# Patient Record
Sex: MALE | Race: OTHER | HISPANIC OR LATINO | URBAN - METROPOLITAN AREA
[De-identification: names, ages, dates, MRNs, and addresses within clinical notes are randomized per-mention and may not be internally consistent; named-entity substitution may affect disease eponyms.]

---

## 2022-06-10 ENCOUNTER — INPATIENT (INPATIENT)
Facility: HOSPITAL | Age: 72
LOS: 4 days | Discharge: HOME | DRG: 284 | End: 2022-06-15
Attending: INTERNAL MEDICINE | Admitting: INTERNAL MEDICINE
Payer: MEDICAID

## 2022-06-10 VITALS
OXYGEN SATURATION: 97 % | RESPIRATION RATE: 17 BRPM | HEART RATE: 88 BPM | DIASTOLIC BLOOD PRESSURE: 87 MMHG | SYSTOLIC BLOOD PRESSURE: 143 MMHG | TEMPERATURE: 100 F

## 2022-06-10 DIAGNOSIS — K81.9 CHOLECYSTITIS, UNSPECIFIED: ICD-10-CM

## 2022-06-10 LAB
ALBUMIN SERPL ELPH-MCNC: 3 G/DL — LOW (ref 3.3–5)
ALP SERPL-CCNC: 94 U/L — SIGNIFICANT CHANGE UP (ref 40–120)
ALT FLD-CCNC: 57 U/L — SIGNIFICANT CHANGE UP (ref 12–78)
ANION GAP SERPL CALC-SCNC: 7 MMOL/L — SIGNIFICANT CHANGE UP (ref 5–17)
APTT BLD: 31.2 SEC — SIGNIFICANT CHANGE UP (ref 27.5–35.5)
AST SERPL-CCNC: 38 U/L — HIGH (ref 15–37)
BASOPHILS # BLD AUTO: 0.04 K/UL — SIGNIFICANT CHANGE UP (ref 0–0.2)
BASOPHILS NFR BLD AUTO: 0.3 % — SIGNIFICANT CHANGE UP (ref 0–2)
BILIRUB SERPL-MCNC: 1.3 MG/DL — HIGH (ref 0.2–1.2)
BUN SERPL-MCNC: 12 MG/DL — SIGNIFICANT CHANGE UP (ref 7–23)
CALCIUM SERPL-MCNC: 8.8 MG/DL — SIGNIFICANT CHANGE UP (ref 8.5–10.1)
CHLORIDE SERPL-SCNC: 105 MMOL/L — SIGNIFICANT CHANGE UP (ref 96–108)
CO2 SERPL-SCNC: 24 MMOL/L — SIGNIFICANT CHANGE UP (ref 22–31)
CREAT SERPL-MCNC: 0.95 MG/DL — SIGNIFICANT CHANGE UP (ref 0.5–1.3)
D DIMER BLD IA.RAPID-MCNC: 1945 NG/ML DDU — HIGH
EGFR: 86 ML/MIN/1.73M2 — SIGNIFICANT CHANGE UP
EOSINOPHIL # BLD AUTO: 0.1 K/UL — SIGNIFICANT CHANGE UP (ref 0–0.5)
EOSINOPHIL NFR BLD AUTO: 0.7 % — SIGNIFICANT CHANGE UP (ref 0–6)
FLUAV AG NPH QL: SIGNIFICANT CHANGE UP
FLUBV AG NPH QL: SIGNIFICANT CHANGE UP
GLUCOSE SERPL-MCNC: 119 MG/DL — HIGH (ref 70–99)
HCT VFR BLD CALC: 41.3 % — SIGNIFICANT CHANGE UP (ref 39–50)
HGB BLD-MCNC: 13.9 G/DL — SIGNIFICANT CHANGE UP (ref 13–17)
IMM GRANULOCYTES NFR BLD AUTO: 0.6 % — SIGNIFICANT CHANGE UP (ref 0–1.5)
INR BLD: 1.21 RATIO — HIGH (ref 0.88–1.16)
LIDOCAIN IGE QN: 193 U/L — SIGNIFICANT CHANGE UP (ref 73–393)
LYMPHOCYTES # BLD AUTO: 1.47 K/UL — SIGNIFICANT CHANGE UP (ref 1–3.3)
LYMPHOCYTES # BLD AUTO: 10.7 % — LOW (ref 13–44)
MCHC RBC-ENTMCNC: 30.2 PG — SIGNIFICANT CHANGE UP (ref 27–34)
MCHC RBC-ENTMCNC: 33.7 GM/DL — SIGNIFICANT CHANGE UP (ref 32–36)
MCV RBC AUTO: 89.8 FL — SIGNIFICANT CHANGE UP (ref 80–100)
MONOCYTES # BLD AUTO: 0.67 K/UL — SIGNIFICANT CHANGE UP (ref 0–0.9)
MONOCYTES NFR BLD AUTO: 4.9 % — SIGNIFICANT CHANGE UP (ref 2–14)
NEUTROPHILS # BLD AUTO: 11.37 K/UL — HIGH (ref 1.8–7.4)
NEUTROPHILS NFR BLD AUTO: 82.8 % — HIGH (ref 43–77)
PLATELET # BLD AUTO: 277 K/UL — SIGNIFICANT CHANGE UP (ref 150–400)
POTASSIUM SERPL-MCNC: 4.2 MMOL/L — SIGNIFICANT CHANGE UP (ref 3.5–5.3)
POTASSIUM SERPL-SCNC: 4.2 MMOL/L — SIGNIFICANT CHANGE UP (ref 3.5–5.3)
PROT SERPL-MCNC: 7.4 GM/DL — SIGNIFICANT CHANGE UP (ref 6–8.3)
PROTHROM AB SERPL-ACNC: 14.1 SEC — HIGH (ref 10.5–13.4)
RBC # BLD: 4.6 M/UL — SIGNIFICANT CHANGE UP (ref 4.2–5.8)
RBC # FLD: 13.2 % — SIGNIFICANT CHANGE UP (ref 10.3–14.5)
RSV RNA NPH QL NAA+NON-PROBE: SIGNIFICANT CHANGE UP
SARS-COV-2 RNA SPEC QL NAA+PROBE: DETECTED
SODIUM SERPL-SCNC: 136 MMOL/L — SIGNIFICANT CHANGE UP (ref 135–145)
TROPONIN I, HIGH SENSITIVITY RESULT: <3 NG/L — SIGNIFICANT CHANGE UP
WBC # BLD: 13.73 K/UL — HIGH (ref 3.8–10.5)
WBC # FLD AUTO: 13.73 K/UL — HIGH (ref 3.8–10.5)

## 2022-06-10 PROCEDURE — 85027 COMPLETE CBC AUTOMATED: CPT

## 2022-06-10 PROCEDURE — 93010 ELECTROCARDIOGRAM REPORT: CPT

## 2022-06-10 PROCEDURE — 87070 CULTURE OTHR SPECIMN AEROBIC: CPT

## 2022-06-10 PROCEDURE — 78226 HEPATOBILIARY SYSTEM IMAGING: CPT | Mod: MG

## 2022-06-10 PROCEDURE — 86803 HEPATITIS C AB TEST: CPT

## 2022-06-10 PROCEDURE — 71275 CT ANGIOGRAPHY CHEST: CPT | Mod: 26

## 2022-06-10 PROCEDURE — 84100 ASSAY OF PHOSPHORUS: CPT

## 2022-06-10 PROCEDURE — C1769: CPT

## 2022-06-10 PROCEDURE — 47490 INCISION OF GALLBLADDER: CPT

## 2022-06-10 PROCEDURE — 85025 COMPLETE CBC W/AUTO DIFF WBC: CPT

## 2022-06-10 PROCEDURE — 83735 ASSAY OF MAGNESIUM: CPT

## 2022-06-10 PROCEDURE — C1729: CPT

## 2022-06-10 PROCEDURE — 71275 CT ANGIOGRAPHY CHEST: CPT | Mod: MA

## 2022-06-10 PROCEDURE — 87075 CULTR BACTERIA EXCEPT BLOOD: CPT

## 2022-06-10 PROCEDURE — G1004: CPT

## 2022-06-10 PROCEDURE — 76705 ECHO EXAM OF ABDOMEN: CPT | Mod: 26

## 2022-06-10 PROCEDURE — 93970 EXTREMITY STUDY: CPT

## 2022-06-10 PROCEDURE — 36415 COLL VENOUS BLD VENIPUNCTURE: CPT

## 2022-06-10 PROCEDURE — A9537: CPT

## 2022-06-10 PROCEDURE — 71046 X-RAY EXAM CHEST 2 VIEWS: CPT | Mod: 26

## 2022-06-10 PROCEDURE — 99285 EMERGENCY DEPT VISIT HI MDM: CPT

## 2022-06-10 PROCEDURE — 80053 COMPREHEN METABOLIC PANEL: CPT

## 2022-06-10 RX ORDER — PIPERACILLIN AND TAZOBACTAM 4; .5 G/20ML; G/20ML
3.38 INJECTION, POWDER, LYOPHILIZED, FOR SOLUTION INTRAVENOUS EVERY 8 HOURS
Refills: 0 | Status: DISCONTINUED | OUTPATIENT
Start: 2022-06-10 | End: 2022-06-15

## 2022-06-10 RX ORDER — PIPERACILLIN AND TAZOBACTAM 4; .5 G/20ML; G/20ML
3.38 INJECTION, POWDER, LYOPHILIZED, FOR SOLUTION INTRAVENOUS ONCE
Refills: 0 | Status: COMPLETED | OUTPATIENT
Start: 2022-06-10 | End: 2022-06-10

## 2022-06-10 RX ADMIN — PIPERACILLIN AND TAZOBACTAM 200 GRAM(S): 4; .5 INJECTION, POWDER, LYOPHILIZED, FOR SOLUTION INTRAVENOUS at 22:13

## 2022-06-10 NOTE — CONSULT NOTE ADULT - ASSESSMENT
73 yo male with h/o HTN?, with RUQ abd pain, gallstones, posit COVID  -I think  he probably has  chronic cholecystitis vs acute, thickened gallbladder wall 7mm. Recommend HIDA scan. If HIDA scan is positive he should get IR percutaneous cholecystostomy since he has COVID and has been having RUQ abd pain for at least 6 days  -IV Abx  -NPO  -Medicine admission

## 2022-06-10 NOTE — CONSULT NOTE ADULT - SUBJECTIVE AND OBJECTIVE BOX
71 yo male Thai speaking h/o HTN? who started coughing and having RUQ abdominal pain 6 days ago, pain is about 5/10. Patient refers having RUQ abd pain in the past. RUQ US showed distended gallbladder with gallstone, thickened gallbladder wall 7mm. WBC 13,000. T gennaro 1.3. Covid posit.    HPI:      PAST MEDICAL & SURGICAL HISTORY:  Mild HTN          REVIEW OF SYSTEMS:    CONSTITUTIONAL: No weakness, fevers or chills  EYES/ENT: No visual changes;  No vertigo or throat pain   NECK: No pain or stiffness  RESPIRATORY: cough, no wheezing, hemoptysis; No shortness of breath  CARDIOVASCULAR: No chest pain or palpitations  GASTROINTESTINAL: abdominal  pain.  nausea, no vomiting, or hematemesis; No diarrhea or constipation. No melena or hematochezia.  GENITOURINARY: No dysuria, frequency or hematuria  NEUROLOGICAL: No numbness or weakness  SKIN: No itching, burning, rashes, or lesions   All other review of systems is negative unless indicated above.    MEDICATIONS  (STANDING):  piperacillin/tazobactam IVPB... 3.375 Gram(s) IV Intermittent once    MEDICATIONS  (PRN):      Allergies    No Known Allergies    Intolerances        SOCIAL HISTORY: non smoker    FAMILY HISTORY: non contributory      Vital Signs Last 24 Hrs  T(C): 37.7 (10 Avelino 2022 18:14), Max: 37.7 (10 Avelino 2022 18:14)  T(F): 99.8 (10 Avelino 2022 18:14), Max: 99.8 (10 Avelino 2022 18:14)  HR: 88 (10 Avelino 2022 18:14) (88 - 88)  BP: 143/87 (10 Avelino 2022 18:14) (143/87 - 143/87)  BP(mean): 102 (10 Avelino 2022 18:14) (102 - 102)  RR: 17 (10 Avelino 2022 18:14) (17 - 17)  SpO2: 97% (10 Avelino 2022 18:14) (97% - 97%)    .  VITAL SIGNS:  T(C): 37.7 (06-10-22 @ 18:14), Max: 37.7 (06-10-22 @ 18:14)  T(F): 99.8 (06-10-22 @ 18:14), Max: 99.8 (06-10-22 @ 18:14)  HR: 88 (06-10-22 @ 18:14) (88 - 88)  BP: 143/87 (06-10-22 @ 18:14) (143/87 - 143/87)  BP(mean): 102 (06-10-22 @ 18:14) (102 - 102)  RR: 17 (06-10-22 @ 18:14) (17 - 17)  SpO2: 97% (06-10-22 @ 18:14) (97% - 97%)  Wt(kg): --    PHYSICAL EXAM:    Constitutional: resting comfortably in bed; NAD  Eyes: PERRL, EOMI, anicteric sclera  ENT: no nasal discharge; uvula midline, no oropharyngeal erythema or exudates  Neck: supple; no JVD or thyromegaly  Respiratory: CTA B/L; no W/R/R, no retractions  Cardiac: +S1/S2; RRR; no murmurs  Gastrointestinal: soft, Ruq tenderness  Back: spine midline, no bony tenderness or step-offs; no CVAT B/L  Extremities: no clubbing or cyanosis; no peripheral edema  Musculoskeletal: NROM x4; no joint swelling, tenderness or erythema  Vascular: 2+ radial, femoral, DP/PT pulses B/L  Dermatologic: skin warm, dry and intact; no rashes, wounds, or scars  Lymphatic: no submandibular or cervical LAD  Neurologic: AAOx3; CNII-XII grossly intact; no focal deficits  Psychiatric: affect and characteristics of appearance, verbalizations, behaviors are appropriate    LABS:                        13.9   13.73 )-----------( 277      ( 10 Avelino 2022 20:07 )             41.3       06-10    136  |  105  |  12  ----------------------------<  119<H>  4.2   |  24  |  0.95    Ca    8.8      10 Avelino 2022 20:07    TPro  7.4  /  Alb  3.0<L>  /  TBili  1.3<H>  /  DBili  x   /  AST  38<H>  /  ALT  57  /  AlkPhos  94  06-10      PT/INR - ( 10 Avelino 2022 20:07 )   PT: 14.1 sec;   INR: 1.21 ratio         PTT - ( 10 Avelino 2022 20:07 )  PTT:31.2 sec        RADIOLOGY & ADDITIONAL STUDIES:  < from: US Abdomen Upper Quadrant Right (06.10.22 @ 19:31) >    Liver: Increased echogenicity.  Bile ducts: Normal caliber. Common bile duct measures 4 mm.  Gallbladder: Distended with multiple shadowing gallstones and sludge.   Wall thickening and edema measuring 7 mm. Technologist reports positive   sonographic Roy sign.  Pancreas: Poorly visualized.  Right kidney: Poorly visualized.  Ascites: None.  IVC: Poorly visualized.    IMPRESSION:  Limited study due to overlying bowel gas.    Cholelithiasis with gallbladder wall thickening and positive sonographic   Roy sign, suspicious for acute cholecystitis.    < end of copied text >

## 2022-06-10 NOTE — ED STATDOCS - OBJECTIVE STATEMENT
72 y M no known medical problems presenting with right upper quadrant abdominal pain ongoing for 6 days.  Occurs intermittently and does seem to worsen occasionally with food intake.  Nauseated but no vomiting.  Occasionally feels dizzy.  Some pain with deep inspiration.  No active chest pain or shortness of breath.  Has had subjective fevers.  No diarrhea.  Still able to tolerate p.o.  No prior abdominal surgeries.    Pac : [id number]

## 2022-06-10 NOTE — ED STATDOCS - CLINICAL SUMMARY MEDICAL DECISION MAKING FREE TEXT BOX
Unclear if this is thoracic or abdominal.  More likely abdominal.  Would evaluate for cholecystitis.  Less suspicious for PE or ACS.  No obvious evidence of SBO.  No tenderness to abdomen aside from right upper quadrant.  Dispo pending labs, imaging, reassessment. Unclear if this is thoracic or abdominal.  More likely abdominal.  Would evaluate for cholecystitis.  Less suspicious for PE or ACS.  No obvious evidence of SBO.  No tenderness to abdomen aside from right upper quadrant.  Dispo pending labs, imaging, reassessment.    PA: Patient presents with RUQ pain. Workup pending. care transitioned to evening team. ~Vahe Tracy PA-C

## 2022-06-10 NOTE — ED ADULT NURSE NOTE - OBJECTIVE STATEMENT
py presenting with right upper quadrant abdominal pain ongoing for 6 days.  Occurs intermittently and does seem to worsen occasionally with food intake.  Nauseated but no vomiting. Labs drawn

## 2022-06-10 NOTE — ED STATDOCS - PROGRESS NOTE DETAILS
PA: Patient is a 72 year old male with PMHx of HTN, who presents to [facility] c/o RUQ pain x 6 days. +Nausea. DENIES v/d. ABD pain worse with eating. Patient also c/o pleuritic CP, worse with deep breaths. ~Vahe Tracy PA-C JOSE pending. Care transitioned to evening team. ~Vahe Tracy PA-C Spoke with Dr. Brooks. States he wouldn't do surgery on the pt especially since he became positive for covid and thinks the gallbladder wall thickening is more chronic in nature. Recommends HIDA in the morning and if its positive then would recommend percutaneous cholecystoscopy. -Dwight Almonte PA-C

## 2022-06-10 NOTE — ED ADULT NURSE NOTE - NSIMPLEMENTINTERV_GEN_ALL_ED
Implemented All Fall Risk Interventions:  [place] to call system. Call bell, personal items and telephone within reach. Instruct patient to call for assistance. Room bathroom lighting operational. Non-slip footwear when patient is off stretcher. Physically safe environment: no spills, clutter or unnecessary equipment. Stretcher in lowest position, wheels locked, appropriate side rails in place. Provide visual cue, wrist band, yellow gown, etc. Monitor gait and stability. Monitor for mental status changes and reorient to person, place, and time. Review medications for side effects contributing to fall risk. Reinforce activity limits and safety measures with patient and family.

## 2022-06-10 NOTE — ED STATDOCS - PHYSICAL EXAMINATION
Constitutional: NAD, well appearing  HEENT: no rhinorrhea  CVS:  RRR, no m/r/g  Resp:  CTAB  GI: soft, moderate ttp to RUQ  MSK:  no restriction to rom, full ROM to all extremities  Neuro:  A&Ox3, moving all extremities equally  Skin: no rash  psych: clear thought content  Heme/lymph:  No LAD

## 2022-06-10 NOTE — ED STATDOCS - CARE PLAN
1 Principal Discharge DX:	Cholecystitis  Secondary Diagnosis:	2019 novel coronavirus disease (COVID-19)  Secondary Diagnosis:	Abdominal pain

## 2022-06-11 LAB
ALBUMIN SERPL ELPH-MCNC: 2.7 G/DL — LOW (ref 3.3–5)
ALP SERPL-CCNC: 79 U/L — SIGNIFICANT CHANGE UP (ref 40–120)
ALT FLD-CCNC: 44 U/L — SIGNIFICANT CHANGE UP (ref 12–78)
ANION GAP SERPL CALC-SCNC: 7 MMOL/L — SIGNIFICANT CHANGE UP (ref 5–17)
AST SERPL-CCNC: 25 U/L — SIGNIFICANT CHANGE UP (ref 15–37)
BASOPHILS # BLD AUTO: 0.03 K/UL — SIGNIFICANT CHANGE UP (ref 0–0.2)
BASOPHILS NFR BLD AUTO: 0.3 % — SIGNIFICANT CHANGE UP (ref 0–2)
BILIRUB SERPL-MCNC: 1.8 MG/DL — HIGH (ref 0.2–1.2)
BUN SERPL-MCNC: 9 MG/DL — SIGNIFICANT CHANGE UP (ref 7–23)
CALCIUM SERPL-MCNC: 8.9 MG/DL — SIGNIFICANT CHANGE UP (ref 8.5–10.1)
CHLORIDE SERPL-SCNC: 104 MMOL/L — SIGNIFICANT CHANGE UP (ref 96–108)
CO2 SERPL-SCNC: 26 MMOL/L — SIGNIFICANT CHANGE UP (ref 22–31)
CREAT SERPL-MCNC: 0.88 MG/DL — SIGNIFICANT CHANGE UP (ref 0.5–1.3)
EGFR: 92 ML/MIN/1.73M2 — SIGNIFICANT CHANGE UP
EOSINOPHIL # BLD AUTO: 0.1 K/UL — SIGNIFICANT CHANGE UP (ref 0–0.5)
EOSINOPHIL NFR BLD AUTO: 0.9 % — SIGNIFICANT CHANGE UP (ref 0–6)
GLUCOSE SERPL-MCNC: 117 MG/DL — HIGH (ref 70–99)
HCT VFR BLD CALC: 38.4 % — LOW (ref 39–50)
HCV AB S/CO SERPL IA: 0.11 S/CO — SIGNIFICANT CHANGE UP (ref 0–0.99)
HCV AB SERPL-IMP: SIGNIFICANT CHANGE UP
HGB BLD-MCNC: 12.9 G/DL — LOW (ref 13–17)
IMM GRANULOCYTES NFR BLD AUTO: 0.8 % — SIGNIFICANT CHANGE UP (ref 0–1.5)
LYMPHOCYTES # BLD AUTO: 1.16 K/UL — SIGNIFICANT CHANGE UP (ref 1–3.3)
LYMPHOCYTES # BLD AUTO: 10.6 % — LOW (ref 13–44)
MCHC RBC-ENTMCNC: 29.8 PG — SIGNIFICANT CHANGE UP (ref 27–34)
MCHC RBC-ENTMCNC: 33.6 GM/DL — SIGNIFICANT CHANGE UP (ref 32–36)
MCV RBC AUTO: 88.7 FL — SIGNIFICANT CHANGE UP (ref 80–100)
MONOCYTES # BLD AUTO: 0.69 K/UL — SIGNIFICANT CHANGE UP (ref 0–0.9)
MONOCYTES NFR BLD AUTO: 6.3 % — SIGNIFICANT CHANGE UP (ref 2–14)
NEUTROPHILS # BLD AUTO: 8.84 K/UL — HIGH (ref 1.8–7.4)
NEUTROPHILS NFR BLD AUTO: 81.1 % — HIGH (ref 43–77)
PLATELET # BLD AUTO: 269 K/UL — SIGNIFICANT CHANGE UP (ref 150–400)
POTASSIUM SERPL-MCNC: 3.7 MMOL/L — SIGNIFICANT CHANGE UP (ref 3.5–5.3)
POTASSIUM SERPL-SCNC: 3.7 MMOL/L — SIGNIFICANT CHANGE UP (ref 3.5–5.3)
PROT SERPL-MCNC: 6.9 GM/DL — SIGNIFICANT CHANGE UP (ref 6–8.3)
RBC # BLD: 4.33 M/UL — SIGNIFICANT CHANGE UP (ref 4.2–5.8)
RBC # FLD: 13.1 % — SIGNIFICANT CHANGE UP (ref 10.3–14.5)
SODIUM SERPL-SCNC: 137 MMOL/L — SIGNIFICANT CHANGE UP (ref 135–145)
WBC # BLD: 10.91 K/UL — HIGH (ref 3.8–10.5)
WBC # FLD AUTO: 10.91 K/UL — HIGH (ref 3.8–10.5)

## 2022-06-11 PROCEDURE — 99223 1ST HOSP IP/OBS HIGH 75: CPT

## 2022-06-11 PROCEDURE — 93970 EXTREMITY STUDY: CPT | Mod: 26

## 2022-06-11 PROCEDURE — 12345: CPT | Mod: NC

## 2022-06-11 PROCEDURE — 78226 HEPATOBILIARY SYSTEM IMAGING: CPT | Mod: 26

## 2022-06-11 RX ORDER — ACETAMINOPHEN 500 MG
650 TABLET ORAL EVERY 6 HOURS
Refills: 0 | Status: DISCONTINUED | OUTPATIENT
Start: 2022-06-11 | End: 2022-06-15

## 2022-06-11 RX ORDER — SODIUM CHLORIDE 9 MG/ML
1000 INJECTION, SOLUTION INTRAVENOUS
Refills: 0 | Status: DISCONTINUED | OUTPATIENT
Start: 2022-06-11 | End: 2022-06-14

## 2022-06-11 RX ORDER — TRAMADOL HYDROCHLORIDE 50 MG/1
25 TABLET ORAL EVERY 6 HOURS
Refills: 0 | Status: DISCONTINUED | OUTPATIENT
Start: 2022-06-11 | End: 2022-06-15

## 2022-06-11 RX ORDER — PIPERACILLIN AND TAZOBACTAM 4; .5 G/20ML; G/20ML
3.38 INJECTION, POWDER, LYOPHILIZED, FOR SOLUTION INTRAVENOUS EVERY 8 HOURS
Refills: 0 | Status: DISCONTINUED | OUTPATIENT
Start: 2022-06-11 | End: 2022-06-11

## 2022-06-11 RX ORDER — MORPHINE SULFATE 50 MG/1
4 CAPSULE, EXTENDED RELEASE ORAL EVERY 4 HOURS
Refills: 0 | Status: DISCONTINUED | OUTPATIENT
Start: 2022-06-11 | End: 2022-06-15

## 2022-06-11 RX ORDER — ENOXAPARIN SODIUM 100 MG/ML
40 INJECTION SUBCUTANEOUS EVERY 24 HOURS
Refills: 0 | Status: DISCONTINUED | OUTPATIENT
Start: 2022-06-11 | End: 2022-06-12

## 2022-06-11 RX ORDER — ONDANSETRON 8 MG/1
4 TABLET, FILM COATED ORAL EVERY 8 HOURS
Refills: 0 | Status: DISCONTINUED | OUTPATIENT
Start: 2022-06-11 | End: 2022-06-15

## 2022-06-11 RX ADMIN — MORPHINE SULFATE 4 MILLIGRAM(S): 50 CAPSULE, EXTENDED RELEASE ORAL at 12:34

## 2022-06-11 RX ADMIN — PIPERACILLIN AND TAZOBACTAM 25 GRAM(S): 4; .5 INJECTION, POWDER, LYOPHILIZED, FOR SOLUTION INTRAVENOUS at 21:26

## 2022-06-11 RX ADMIN — Medication 650 MILLIGRAM(S): at 17:39

## 2022-06-11 RX ADMIN — MORPHINE SULFATE 4 MILLIGRAM(S): 50 CAPSULE, EXTENDED RELEASE ORAL at 13:36

## 2022-06-11 RX ADMIN — PIPERACILLIN AND TAZOBACTAM 25 GRAM(S): 4; .5 INJECTION, POWDER, LYOPHILIZED, FOR SOLUTION INTRAVENOUS at 14:02

## 2022-06-11 RX ADMIN — PIPERACILLIN AND TAZOBACTAM 25 GRAM(S): 4; .5 INJECTION, POWDER, LYOPHILIZED, FOR SOLUTION INTRAVENOUS at 05:43

## 2022-06-11 RX ADMIN — ENOXAPARIN SODIUM 40 MILLIGRAM(S): 100 INJECTION SUBCUTANEOUS at 17:39

## 2022-06-11 RX ADMIN — SODIUM CHLORIDE 75 MILLILITER(S): 9 INJECTION, SOLUTION INTRAVENOUS at 17:40

## 2022-06-11 NOTE — H&P ADULT - NSHPREVIEWOFSYSTEMS_GEN_ALL_CORE
Gen: + fevers, malaise  Eyes: no blurred vision or lacrimation  ENT: no tinnitus, vertigo, or decreased hearing  Resp: + cough, pleuritic lower chest/upper abdominal pain. No wheezing, other dyspnea, or hemoptysis  CV: no chest pain or palpitations  GI: + abdominal pain, nausea. No vomiting, diarrhea, constipation, melena, or hematochezia  : no dysuria, hematuria, or incontinence  MSK: no arthralgias, joint swelling, or myalgias  Neuro: no focal deficits, confusion, weakness, dizziness, tremors, or seizures  Skin: no rash, lesions, or edema

## 2022-06-11 NOTE — H&P ADULT - HISTORY OF PRESENT ILLNESS
71 yo Iranian-speaking M with a PMH of HTN (on a medication that he does not know the name) who presents with RUQ pain. The past has been going on for about 6 days, in his right lower chest/upper abdomen. It comes when he takes a deep breath. He also noted nausea without vomiting. He also endorses fevers during that time. He denies chest pain, but notes a chronic dry cough for the past 6 months. He denies diarrhea, N/V, pain elsewhere, rash, or swelling. He is from [facility] and has been there for the past year and returned to [place] a couple days ago. He denies any specific sick contacts. He is unsure if he has had weight changes but states his appetite has been good. He is vaccinated x2 (Pfizer) against COVID-19, but has not taken a booster.    In the ED, he was given Zosyn 3.375 g IV x1.

## 2022-06-11 NOTE — PROGRESS NOTE ADULT - SUBJECTIVE AND OBJECTIVE BOX
73 yo male with cholecystitis, COVID, c/o RUQ abd pain, low grade fever    Abd soft, RUQ tenderness              Vital Signs Last 24 Hrs  T(C): 37.3 (11 Jun 2022 08:00), Max: 37.8 (11 Jun 2022 02:18)  T(F): 99.1 (11 Jun 2022 08:00), Max: 100.1 (11 Jun 2022 02:18)  HR: 82 (11 Jun 2022 08:00) (82 - 101)  BP: 131/84 (11 Jun 2022 08:00) (127/74 - 143/87)  BP(mean): 102 (10 Avelino 2022 18:14) (102 - 102)  RR: 18 (11 Jun 2022 08:00) (17 - 18)  SpO2: 94% (11 Jun 2022 08:00) (94% - 98%)                          12.9   10.91 )-----------( 269      ( 11 Jun 2022 09:01 )             38.4       06-10    136  |  105  |  12  ----------------------------<  119<H>  4.2   |  24  |  0.95    Ca    8.8      10 Avelino 2022 20:07    TPro  7.4  /  Alb  3.0<L>  /  TBili  1.3<H>  /  DBili  x   /  AST  38<H>  /  ALT  57  /  AlkPhos  94  06-10      LIVER FUNCTIONS - ( 10 Avelino 2022 20:07 )  Alb: 3.0 g/dL / Pro: 7.4 gm/dL / ALK PHOS: 94 U/L / ALT: 57 U/L / AST: 38 U/L / GGT: x             MEDICATIONS  (STANDING):  enoxaparin Injectable 40 milliGRAM(s) SubCutaneous every 24 hours  piperacillin/tazobactam IVPB.. 3.375 Gram(s) IV Intermittent every 8 hours    MEDICATIONS  (PRN):  acetaminophen     Tablet .. 650 milliGRAM(s) Oral every 6 hours PRN Temp greater or equal to 38C (100.4F), Mild Pain (1 - 3), Moderate Pain (4 - 6)  ondansetron Injectable 4 milliGRAM(s) IV Push every 8 hours PRN Nausea and/or Vomiting  traMADol 25 milliGRAM(s) Oral every 6 hours PRN Severe Pain (7 - 10)      MEDICATIONS  (STANDING):  enoxaparin Injectable 40 milliGRAM(s) SubCutaneous every 24 hours  piperacillin/tazobactam IVPB.. 3.375 Gram(s) IV Intermittent every 8 hours

## 2022-06-11 NOTE — PATIENT PROFILE ADULT - FALL HARM RISK - RISK INTERVENTIONS
Assistance with ambulation/Communicate Fall Risk and Risk Factors to all staff, patient, and family/Reinforce activity limits and safety measures with patient and family/Visual Cue: Yellow wristband/Bed in lowest position, wheels locked, appropriate side rails in place/Call bell, personal items and telephone in reach/Instruct patient to call for assistance before getting out of bed or chair/Non-slip footwear when patient is out of bed/[place] to call system/Physically safe environment - no spills, clutter or unnecessary equipment/Purposeful Proactive Rounding/Room/bathroom lighting operational, light cord in reach

## 2022-06-11 NOTE — PROGRESS NOTE ADULT - ASSESSMENT
73 yo male with cholecystitis, COVID  -NPO  -HIDA scan to be done 1pm  -He might need IR percutaneous cholecystostosmy if HIDA is posit  -IV Abx

## 2022-06-11 NOTE — H&P ADULT - NSHPLABSRESULTS_GEN_ALL_CORE
Labs personally reviewed and interpreted. Notable for leukocytosis (WBC 13.73) with 82.8% without lymphopenia. Hb 13.9, plt 277, INR 1.21, D-dimer elevated at 1945, HS troponin negative at < 3, Na 136, K 4.2, Cl 105, HCO3 24, BUN/creatinine 12/0.93, , calcium 8.8 (albumin 3.0), total bilirubin slightly eelvated at 1.3, alk phos normal at 94, AST minimally elevated at 38, ALT normal at 57, and lipase normal at 193.  Flu A/B/RSV negative.  COVID-19 PCR POSITIVE.    CXR personally reviewed and interpreted. Notable for elevated L hemidiaphragm, clear lungs, no focal consolidations, effusions, interstitial markings, pneumothorax, or obvious cardiomegaly.  RUQ US personally reviewed and interpreted. Notable for being limited study due to overlying bowel gas. Increased liver echogenicity. CBD measures 4 mm. Cholelithiasis with gallbladder wall thickening/edema measuring 7 mm and sludge, and positive sonographic Roy sign, suspicious for acute cholecystitis.  CTA chest results pending.    EKG personally reviewed. Normal sinus rhythm, normal axis. Slight TWI in lead III. No pathological Q waves or ST depressions/elevations. Rate 88, , QTc 425. No prior EKG for comparison.

## 2022-06-11 NOTE — PROGRESS NOTE ADULT - SUBJECTIVE AND OBJECTIVE BOX
Cheif complaints and Diagnosis:  Acute cholycystitis    Subjective:       REVIEW OF SYSTEMS:    CONSTITUTIONAL: No weakness, fevers or chills  EYES/ENT: No visual changes;  No vertigo or throat pain   NECK: No pain or stiffness  RESPIRATORY: No cough, wheezing, hemoptysis; No shortness of breath  CARDIOVASCULAR: No chest pain or palpitations  GASTROINTESTINAL: No abdominal or epigastric pain. No nausea, vomiting, or hematemesis; No diarrhea or constipation. No melena or hematochezia.  GENITOURINARY: No dysuria, frequency or hematuria  NEUROLOGICAL: No numbness or weakness  SKIN: No itching, burning, rashes, or lesions   All other review of systems is negative unless indicated above      Vital Signs Last 24 Hrs  T(C): 37.3 (11 Jun 2022 08:00), Max: 37.8 (11 Jun 2022 02:18)  T(F): 99.1 (11 Jun 2022 08:00), Max: 100.1 (11 Jun 2022 02:18)  HR: 82 (11 Jun 2022 08:00) (82 - 101)  BP: 131/84 (11 Jun 2022 08:00) (127/74 - 143/87)  BP(mean): 102 (10 Avelino 2022 18:14) (102 - 102)  RR: 18 (11 Jun 2022 08:00) (17 - 18)  SpO2: 94% (11 Jun 2022 08:00) (94% - 98%)    HEENT:   pupils equal and reactive, EOMI, no oropharyngeal lesions, erythema, exudates, oral thrush    NECK:   supple, no carotid bruits, no palpable lymph nodes, no thyromegaly    CV:  +S1, +S2, regular, no murmurs or rubs    RESP:   lungs clear to auscultation bilaterally, no wheezing, rales, rhonchi, good air entry bilaterally    BREAST:  not examined    GI:  abdomen soft, non-tender, non-distended, normal BS, no bruits, no abdominal masses, no palpable masses    RECTAL:  not examined    :  not examined    MSK:   normal muscle tone, no atrophy, no rigidity, no contractions    EXT:   no clubbing, no cyanosis, no edema, no calf pain, swelling or erythema    VASCULAR:  pulses equal and symmetric in the upper and lower extremities    NEURO:  AAOX3, no focal neurological deficits, follows all commands, able to move extremities spontaneously    SKIN:  no ulcers, lesions or rashes    MEDICATIONS  (STANDING):  enoxaparin Injectable 40 milliGRAM(s) SubCutaneous every 24 hours  piperacillin/tazobactam IVPB.. 3.375 Gram(s) IV Intermittent every 8 hours    MEDICATIONS  (PRN):  acetaminophen     Tablet .. 650 milliGRAM(s) Oral every 6 hours PRN Temp greater or equal to 38C (100.4F), Mild Pain (1 - 3), Moderate Pain (4 - 6)  ondansetron Injectable 4 milliGRAM(s) IV Push every 8 hours PRN Nausea and/or Vomiting  traMADol 25 milliGRAM(s) Oral every 6 hours PRN Severe Pain (7 - 10)      10 Avelino 2022 20:07    136    |  105    |  12     ----------------------------<  119    4.2     |  24     |  0.95     Ca    8.8        10 Avelino 2022 20:07    TPro  7.4    /  Alb  3.0    /  TBili  1.3    /  DBili  x      /  AST  38     /  ALT  57     /  AlkPhos  94     10 Avelino 2022 20:07  LIVER FUNCTIONS - ( 10 Avelino 2022 20:07 )  Alb: 3.0 g/dL / Pro: 7.4 gm/dL / ALK PHOS: 94 U/L / ALT: 57 U/L / AST: 38 U/L / GGT: x         PT/INR - ( 10 Avelino 2022 20:07 )   PT: 14.1 sec;   INR: 1.21 ratio         PTT - ( 10 Avelino 2022 20:07 )  PTT:31.2 secCBC Full  -  ( 10 Avelino 2022 20:07 )  WBC Count : 13.73 K/uL  Hemoglobin : 13.9 g/dL  Hematocrit : 41.3 %  Platelet Count - Automated : 277 K/uL  Mean Cell Volume : 89.8 fl  Mean Cell Hemoglobin : 30.2 pg  Mean Cell Hemoglobin Concentration : 33.7 gm/dL  Auto Neutrophil # : 11.37 K/uL  Auto Lymphocyte # : 1.47 K/uL  Auto Monocyte # : 0.67 K/uL  Auto Eosinophil # : 0.10 K/uL  Auto Basophil # : 0.04 K/uL  Auto Neutrophil % : 82.8 %  Auto Lymphocyte % : 10.7 %  Auto Monocyte % : 4.9 %  Auto Eosinophil % : 0.7 %  Auto Basophil % : 0.3 %            PT/INR - ( 10 Avelino 2022 20:07 )   PT: 14.1 sec;   INR: 1.21 ratio         PTT - ( 10 Avelino 2022 20:07 )  PTT:31.2 sec          Assessment and Plan:        	  73 yo Palauan-speaking M with a PMH of HTN (on a medication that he does not know the name) who presents with RUQ pain due to suspected acute cholecystitis, also found to be COVID-19 positive.    #(R/O) Acute Cholecystitis  - Patient c/o RUQ pain, pleuritic, with positive Roy's sign  - Gallstones with GB wall thickening and sludge seen on RUQ US, very suspicious for cholecystitis (acute vs chronic)  - Seen by surgery, appreciate recs. Recommending pt have HIDA scan (should have in AM). If HIDA positive, will consult IR for percutaneous cholecystostomy drain  - Pain control with Tylenol and Tramadol  - IV antibiotics with Zosyn    #COVID-19 Virus Infection  - Appears to be incidentally found  - Pt endorses cough for "6 months"  - Unclear timeline of patient's illness. Therefore, unclear benefit of COVID-19 treatments, as the patient may be past the window of opportunity, and given patient's lack of hypoxia and otherwise stability, would watch for now off treatment  - D-dimer elevated but CTA shows no PE but shows pulmonary nodules, can be followed up as an outpatient    #Prophylactic Measure  - DVT PPX: Lovenox  - Diet: DASH  - Dispo: pending improvement in sxs, above plan Cheif complaints and Diagnosis:  Acute cholycystitis    Subjective: mild RUQ tenderness      REVIEW OF SYSTEMS:    CONSTITUTIONAL: No weakness, fevers or chills  EYES/ENT: No visual changes;  No vertigo or throat pain   NECK: No pain or stiffness  RESPIRATORY: No cough, wheezing, hemoptysis; No shortness of breath  CARDIOVASCULAR: No chest pain or palpitations  GASTROINTESTINAL: No abdominal or epigastric pain. No nausea, vomiting, or hematemesis; No diarrhea or constipation. No melena or hematochezia.  GENITOURINARY: No dysuria, frequency or hematuria  NEUROLOGICAL: No numbness or weakness  SKIN: No itching, burning, rashes, or lesions   All other review of systems is negative unless indicated above      Vital Signs Last 24 Hrs  T(C): 37.3 (11 Jun 2022 08:00), Max: 37.8 (11 Jun 2022 02:18)  T(F): 99.1 (11 Jun 2022 08:00), Max: 100.1 (11 Jun 2022 02:18)  HR: 82 (11 Jun 2022 08:00) (82 - 101)  BP: 131/84 (11 Jun 2022 08:00) (127/74 - 143/87)  BP(mean): 102 (10 Avelino 2022 18:14) (102 - 102)  RR: 18 (11 Jun 2022 08:00) (17 - 18)  SpO2: 94% (11 Jun 2022 08:00) (94% - 98%)    HEENT:   pupils equal and reactive, EOMI, no oropharyngeal lesions, erythema, exudates, oral thrush    NECK:   supple, no carotid bruits, no palpable lymph nodes, no thyromegaly    CV:  +S1, +S2, regular, no murmurs or rubs    RESP:   lungs clear to auscultation bilaterally, no wheezing, rales, rhonchi, good air entry bilaterally    BREAST:  not examined    GI:  very mild positive RUQ tenderness, negative murphys on my exam, abdomen soft,  non-distended, normal BS, no bruits, no abdominal masses, no palpable masses    RECTAL:  not examined    :  not examined    MSK:   normal muscle tone, no atrophy, no rigidity, no contractions    EXT:   no clubbing, no cyanosis, no edema, no calf pain, swelling or erythema    VASCULAR:  pulses equal and symmetric in the upper and lower extremities    NEURO:  AAOX3, no focal neurological deficits, follows all commands, able to move extremities spontaneously    SKIN:  no ulcers, lesions or rashes    MEDICATIONS  (STANDING):  enoxaparin Injectable 40 milliGRAM(s) SubCutaneous every 24 hours  piperacillin/tazobactam IVPB.. 3.375 Gram(s) IV Intermittent every 8 hours    MEDICATIONS  (PRN):  acetaminophen     Tablet .. 650 milliGRAM(s) Oral every 6 hours PRN Temp greater or equal to 38C (100.4F), Mild Pain (1 - 3), Moderate Pain (4 - 6)  ondansetron Injectable 4 milliGRAM(s) IV Push every 8 hours PRN Nausea and/or Vomiting  traMADol 25 milliGRAM(s) Oral every 6 hours PRN Severe Pain (7 - 10)      10 Avelino 2022 20:07    136    |  105    |  12     ----------------------------<  119    4.2     |  24     |  0.95     Ca    8.8        10 Avelino 2022 20:07    TPro  7.4    /  Alb  3.0    /  TBili  1.3    /  DBili  x      /  AST  38     /  ALT  57     /  AlkPhos  94     10 Avelino 2022 20:07  LIVER FUNCTIONS - ( 10 Avelino 2022 20:07 )  Alb: 3.0 g/dL / Pro: 7.4 gm/dL / ALK PHOS: 94 U/L / ALT: 57 U/L / AST: 38 U/L / GGT: x         PT/INR - ( 10 Avelino 2022 20:07 )   PT: 14.1 sec;   INR: 1.21 ratio         PTT - ( 10 Avelino 2022 20:07 )  PTT:31.2 secCBC Full  -  ( 10 Avelino 2022 20:07 )  WBC Count : 13.73 K/uL  Hemoglobin : 13.9 g/dL  Hematocrit : 41.3 %  Platelet Count - Automated : 277 K/uL  Mean Cell Volume : 89.8 fl  Mean Cell Hemoglobin : 30.2 pg  Mean Cell Hemoglobin Concentration : 33.7 gm/dL  Auto Neutrophil # : 11.37 K/uL  Auto Lymphocyte # : 1.47 K/uL  Auto Monocyte # : 0.67 K/uL  Auto Eosinophil # : 0.10 K/uL  Auto Basophil # : 0.04 K/uL  Auto Neutrophil % : 82.8 %  Auto Lymphocyte % : 10.7 %  Auto Monocyte % : 4.9 %  Auto Eosinophil % : 0.7 %  Auto Basophil % : 0.3 %            PT/INR - ( 10 Avelino 2022 20:07 )   PT: 14.1 sec;   INR: 1.21 ratio         PTT - ( 10 Avelino 2022 20:07 )  PTT:31.2 sec          Assessment and Plan:        	  73 yo Dominican-speaking M with a PMH of HTN (on a medication that he does not know the name) who presents with RUQ pain due to suspected acute cholecystitis, also found to be COVID-19 positive.    #(R/O) Acute Cholecystitis  -HIDA to confirm cholycystitis; on my examination, negrete is negative, but patient does express some discomfort on palpation ruq  -surgery consult  appreciated  -c/w zosyn      #COVID-19 Virus Infection  asymptomatic  -dimer elevated, but no PE/ no DVT  -will need xarelto for DC given very high dimer    #Prophylactic Measure  - DVT PPX: Lovenox  - Diet: DASH  - Dispo: pending improvement in sxs, above plan

## 2022-06-11 NOTE — H&P ADULT - ASSESSMENT
71 yo Greenlandic-speaking M with a PMH of HTN (on a medication that he does not know the name) who presents with RUQ pain due to suspected acute cholecystitis, also found to be COVID-19 positive.    #(R/O) Acute Cholecystitis      #COVID-19 Infection      #Prophylactic Measure  - DVT PPX:  -Diet:   - Dispo:  73 yo Hungarian-speaking M with a PMH of HTN (on a medication that he does not know the name) who presents with RUQ pain due to suspected acute cholecystitis, also found to be COVID-19 positive.    #(R/O) Acute Cholecystitis  - Patient c/o RUQ pain, pleuritic, with positive Roy's sign  - Gallstones with GB wall thickening and sludge seen on RUQ US, very suspicious for cholecystitis (acute vs chronic)  - Seen by surgery, appreciate recs. Recommending pt have HIDA scan (should have in AM). If HIDA positive, will consult IR for percutaneous cholecystostomy drain  - Pain control with Tylenol and Tramadol  - IV antibiotics with Zosyn    #COVID-19 Virus Infection  - ___________________    #Prophylactic Measure  - DVT PPX: Lovenox  - Diet: DASH  - Dispo: pending improvement in sxs, above plan 73 yo Algerian-speaking M with a PMH of HTN (on a medication that he does not know the name) who presents with RUQ pain due to suspected acute cholecystitis, also found to be COVID-19 positive.    #(R/O) Acute Cholecystitis  - Patient c/o RUQ pain, pleuritic, with positive Roy's sign  - Gallstones with GB wall thickening and sludge seen on RUQ US, very suspicious for cholecystitis (acute vs chronic)  - Seen by surgery, appreciate recs. Recommending pt have HIDA scan (should have in AM). If HIDA positive, will consult IR for percutaneous cholecystostomy drain  - Pain control with Tylenol and Tramadol  - IV antibiotics with Zosyn    #COVID-19 Virus Infection  - Appears to be incidentally found  - Pt endorses cough for "6 months"  - Unclear timeline of patient's illness. Therefore, unclear benefit of COVID-19 treatments, as the patient may be past the window of opportunity, and given patient's lack of hypoxia and otherwise stability, would watch for now off treatment  - D-dimer elevated but CTA shows no PE but shows pulmonary nodules, can be followed up as an outpatient    #Prophylactic Measure  - DVT PPX: Lovenox  - Diet: DASH  - Dispo: pending improvement in sxs, above plan

## 2022-06-11 NOTE — H&P ADULT - NSHPPHYSICALEXAM_GEN_ALL_CORE
Vital Signs Last 24 Hrs  T(C): 37.8 (11 Jun 2022 02:18), Max: 37.8 (11 Jun 2022 02:18)  T(F): 100.1 (11 Jun 2022 02:18), Max: 100.1 (11 Jun 2022 02:18)  HR: 101 (11 Jun 2022 02:18) (86 - 101)  BP: 127/74 (11 Jun 2022 02:18) (127/74 - 143/87)  BP(mean): 102 (10 Avelino 2022 18:14) (102 - 102)  RR: 18 (11 Jun 2022 02:18) (17 - 18)  SpO2: 94% (11 Jun 2022 02:18) (94% - 98%)    GENERAL: No acute distress  HEENT: PERRL, EOMI, MM dry, no oropharyngeal lesions  NECK: supple, no stiffness, no JVD, no thyromegaly  PULM: respirations non-labored, clear to auscultation bilaterally, no rales, rhonchi, or wheezes  CV: regular rate and rhythm, no murmurs, gallops, or rubs  GI: abdomen soft, + RUQ TTP, nondistended, no masses felt, normal bowel sounds  MSK: strength 5/5 bilateral upper/lower extremities. No joint swelling, erythema, or warmth.  LYMPH: no anterior cervical, posterior cervical, supraclavicular, or inguinal lymphadenopathy  NEURO: A&Ox3, no tremors, sensation intact  SKIN: no rashes, lesions, or edema

## 2022-06-12 LAB
ALBUMIN SERPL ELPH-MCNC: 2.5 G/DL — LOW (ref 3.3–5)
ALP SERPL-CCNC: 75 U/L — SIGNIFICANT CHANGE UP (ref 40–120)
ALT FLD-CCNC: 42 U/L — SIGNIFICANT CHANGE UP (ref 12–78)
ANION GAP SERPL CALC-SCNC: 9 MMOL/L — SIGNIFICANT CHANGE UP (ref 5–17)
AST SERPL-CCNC: 26 U/L — SIGNIFICANT CHANGE UP (ref 15–37)
BILIRUB SERPL-MCNC: 0.8 MG/DL — SIGNIFICANT CHANGE UP (ref 0.2–1.2)
BUN SERPL-MCNC: 11 MG/DL — SIGNIFICANT CHANGE UP (ref 7–23)
CALCIUM SERPL-MCNC: 8.4 MG/DL — LOW (ref 8.5–10.1)
CHLORIDE SERPL-SCNC: 104 MMOL/L — SIGNIFICANT CHANGE UP (ref 96–108)
CO2 SERPL-SCNC: 24 MMOL/L — SIGNIFICANT CHANGE UP (ref 22–31)
CREAT SERPL-MCNC: 0.98 MG/DL — SIGNIFICANT CHANGE UP (ref 0.5–1.3)
EGFR: 82 ML/MIN/1.73M2 — SIGNIFICANT CHANGE UP
GLUCOSE SERPL-MCNC: 123 MG/DL — HIGH (ref 70–99)
HCT VFR BLD CALC: 36.8 % — LOW (ref 39–50)
HGB BLD-MCNC: 12.4 G/DL — LOW (ref 13–17)
MCHC RBC-ENTMCNC: 30.2 PG — SIGNIFICANT CHANGE UP (ref 27–34)
MCHC RBC-ENTMCNC: 33.7 GM/DL — SIGNIFICANT CHANGE UP (ref 32–36)
MCV RBC AUTO: 89.8 FL — SIGNIFICANT CHANGE UP (ref 80–100)
PLATELET # BLD AUTO: 276 K/UL — SIGNIFICANT CHANGE UP (ref 150–400)
POTASSIUM SERPL-MCNC: 3.4 MMOL/L — LOW (ref 3.5–5.3)
POTASSIUM SERPL-SCNC: 3.4 MMOL/L — LOW (ref 3.5–5.3)
PROT SERPL-MCNC: 6.4 GM/DL — SIGNIFICANT CHANGE UP (ref 6–8.3)
RBC # BLD: 4.1 M/UL — LOW (ref 4.2–5.8)
RBC # FLD: 13.3 % — SIGNIFICANT CHANGE UP (ref 10.3–14.5)
SODIUM SERPL-SCNC: 137 MMOL/L — SIGNIFICANT CHANGE UP (ref 135–145)
WBC # BLD: 6.66 K/UL — SIGNIFICANT CHANGE UP (ref 3.8–10.5)
WBC # FLD AUTO: 6.66 K/UL — SIGNIFICANT CHANGE UP (ref 3.8–10.5)

## 2022-06-12 PROCEDURE — 99233 SBSQ HOSP IP/OBS HIGH 50: CPT

## 2022-06-12 RX ORDER — POTASSIUM CHLORIDE 20 MEQ
40 PACKET (EA) ORAL ONCE
Refills: 0 | Status: COMPLETED | OUTPATIENT
Start: 2022-06-12 | End: 2022-06-12

## 2022-06-12 RX ADMIN — PIPERACILLIN AND TAZOBACTAM 25 GRAM(S): 4; .5 INJECTION, POWDER, LYOPHILIZED, FOR SOLUTION INTRAVENOUS at 22:02

## 2022-06-12 RX ADMIN — SODIUM CHLORIDE 75 MILLILITER(S): 9 INJECTION, SOLUTION INTRAVENOUS at 21:58

## 2022-06-12 RX ADMIN — Medication 650 MILLIGRAM(S): at 23:42

## 2022-06-12 RX ADMIN — PIPERACILLIN AND TAZOBACTAM 25 GRAM(S): 4; .5 INJECTION, POWDER, LYOPHILIZED, FOR SOLUTION INTRAVENOUS at 05:57

## 2022-06-12 RX ADMIN — Medication 40 MILLIEQUIVALENT(S): at 13:45

## 2022-06-12 RX ADMIN — PIPERACILLIN AND TAZOBACTAM 25 GRAM(S): 4; .5 INJECTION, POWDER, LYOPHILIZED, FOR SOLUTION INTRAVENOUS at 13:45

## 2022-06-12 RX ADMIN — Medication 650 MILLIGRAM(S): at 11:07

## 2022-06-12 RX ADMIN — Medication 650 MILLIGRAM(S): at 09:27

## 2022-06-12 NOTE — PROGRESS NOTE ADULT - ASSESSMENT
71 yo male with acute cholecystitis, COVID  -I think patient should go for IR percut cholecystostomy even though his pain is better, he spiked low grade fever last night; unless his abdominal pain completely resolves in that case IR might not be needed  -IV Abx

## 2022-06-12 NOTE — CHART NOTE - NSCHARTNOTEFT_GEN_A_CORE
Patient went to IR today because septic, spiking temps , for cholcystostomy drain.   As per IR staff, patient unable to give consent, did not know his . no family was able to be reached and patient was returned back to his room.       later in shift family came to see patient. Nurse used  phone again. Patient knows why he is in hospital, understands why he needs procedure and was able to say all this back to nursing staff. Family collaborates story that patient is alert and oriented.  Difficulty seems to be with possible lack of education and cultural barriers.  Patient does not seem to know his exact  and this may be a cultural barrier.   this is not to assume that patient is not alert and oriented x 3, family and nursing staff collaborate this.     I personally spoke to patient earlier in day and he understood his prognosis and gave me verbal consent for procedure.     for IR janet if consent is needed can reach the following numbers if IR is unable to get consent from patient:    Smith (son) [phone]  Raven (daughter?) [phone]  David (son) [phone]  wife jd does not have cell  phone.     none of family members speak english, you will need  phone.
Patient with positive HIDA scan. not overtly septic at the moment. tolerating antibiotics.     Discussed case with Dr. Brooks who feels perco choly drain is best option at the moment.     Discusssed case with Dr. Mak on IR whom will re-eval patient in AM for possible perc choly drain.     Keep NPO except ice chips.

## 2022-06-12 NOTE — PROGRESS NOTE ADULT - SUBJECTIVE AND OBJECTIVE BOX
71 yo male with acute cholecystitis, COVID, HIDA posit, feels better, low grade fever yest    Abd soft, less tender RUQ              Vital Signs Last 24 Hrs  T(C): 37.4 (11 Jun 2022 23:30), Max: 38.6 (11 Jun 2022 16:01)  T(F): 99.4 (11 Jun 2022 23:30), Max: 101.5 (11 Jun 2022 16:01)  HR: 81 (11 Jun 2022 23:30) (81 - 83)  BP: 109/70 (11 Jun 2022 23:30) (109/70 - 143/78)  BP(mean): --  RR: 18 (11 Jun 2022 23:30) (18 - 18)  SpO2: 93% (11 Jun 2022 23:30) (93% - 95%)                          12.9   10.91 )-----------( 269      ( 11 Jun 2022 09:01 )             38.4       06-11    137  |  104  |  9   ----------------------------<  117<H>  3.7   |  26  |  0.88    Ca    8.9      11 Jun 2022 09:01  Phos  2.7     06-11  Mg     2.4     06-11    TPro  6.9  /  Alb  2.7<L>  /  TBili  1.8<H>  /  DBili  x   /  AST  25  /  ALT  44  /  AlkPhos  79  06-11      LIVER FUNCTIONS - ( 11 Jun 2022 09:01 )  Alb: 2.7 g/dL / Pro: 6.9 gm/dL / ALK PHOS: 79 U/L / ALT: 44 U/L / AST: 25 U/L / GGT: x             MEDICATIONS  (STANDING):  dextrose 5% + sodium chloride 0.9%. 1000 milliLiter(s) (75 mL/Hr) IV Continuous <Continuous>  enoxaparin Injectable 40 milliGRAM(s) SubCutaneous every 24 hours  piperacillin/tazobactam IVPB.. 3.375 Gram(s) IV Intermittent every 8 hours    MEDICATIONS  (PRN):  acetaminophen     Tablet .. 650 milliGRAM(s) Oral every 6 hours PRN Temp greater or equal to 38C (100.4F), Mild Pain (1 - 3)  morphine  - Injectable 4 milliGRAM(s) IV Push every 4 hours PRN Moderate Pain (4 - 6)  ondansetron Injectable 4 milliGRAM(s) IV Push every 8 hours PRN Nausea and/or Vomiting  traMADol 25 milliGRAM(s) Oral every 6 hours PRN Severe Pain (7 - 10)      MEDICATIONS  (STANDING):  dextrose 5% + sodium chloride 0.9%. 1000 milliLiter(s) (75 mL/Hr) IV Continuous <Continuous>  enoxaparin Injectable 40 milliGRAM(s) SubCutaneous every 24 hours  piperacillin/tazobactam IVPB.. 3.375 Gram(s) IV Intermittent every 8 hours

## 2022-06-12 NOTE — PROGRESS NOTE ADULT - SUBJECTIVE AND OBJECTIVE BOX
[facility] complaints and Diagnosis: acute cholycystitis    Subjective: patient appears comfortable, but had temp this morning 101.3. says he feels warm and knows he is having fevers. explained IR cholycystostomy via  ph, he is in agreement.        REVIEW OF SYSTEMS:    CONSTITUTIONAL: No weakness, fevers or chills  EYES/ENT: No visual changes;  No vertigo or throat pain   NECK: No pain or stiffness  RESPIRATORY: No cough, wheezing, hemoptysis; No shortness of breath  CARDIOVASCULAR: No chest pain or palpitations  GASTROINTESTINAL: No abdominal or epigastric pain. No nausea, vomiting, or hematemesis; No diarrhea or constipation. No melena or hematochezia.  GENITOURINARY: No dysuria, frequency or hematuria  NEUROLOGICAL: No numbness or weakness  SKIN: No itching, burning, rashes, or lesions   All other review of systems is negative unless indicated above      Vital Signs Last 24 Hrs  T(C): 38.5 (12 Jun 2022 08:12), Max: 38.6 (11 Jun 2022 16:01)  T(F): 101.3 (12 Jun 2022 08:12), Max: 101.5 (11 Jun 2022 16:01)  HR: 84 (12 Jun 2022 08:12) (81 - 84)  BP: 113/64 (12 Jun 2022 08:12) (109/70 - 143/78)  BP(mean): --  RR: 18 (12 Jun 2022 08:12) (18 - 18)  SpO2: 93% (12 Jun 2022 08:12) (93% - 95%)    HEENT:   pupils equal and reactive, EOMI, no oropharyngeal lesions, erythema, exudates, oral thrush    NECK:   supple, no carotid bruits, no palpable lymph nodes, no thyromegaly    CV:  +S1, +S2, regular, no murmurs or rubs    RESP:   lungs clear to auscultation bilaterally, no wheezing, rales, rhonchi, good air entry bilaterally    BREAST:  not examined    GI:  abdomen soft, non-tender, non-distended, normal BS, no bruits, no abdominal masses, no palpable masses    RECTAL:  not examined    :  not examined    MSK:   normal muscle tone, no atrophy, no rigidity, no contractions    EXT:   no clubbing, no cyanosis, no edema, no calf pain, swelling or erythema    VASCULAR:  pulses equal and symmetric in the upper and lower extremities    NEURO:  AAOX3, no focal neurological deficits, follows all commands, able to move extremities spontaneously    SKIN:  no ulcers, lesions or rashes    MEDICATIONS  (STANDING):  dextrose 5% + sodium chloride 0.9%. 1000 milliLiter(s) (75 mL/Hr) IV Continuous <Continuous>  piperacillin/tazobactam IVPB.. 3.375 Gram(s) IV Intermittent every 8 hours    MEDICATIONS  (PRN):  acetaminophen     Tablet .. 650 milliGRAM(s) Oral every 6 hours PRN Temp greater or equal to 38C (100.4F), Mild Pain (1 - 3)  morphine  - Injectable 4 milliGRAM(s) IV Push every 4 hours PRN Moderate Pain (4 - 6)  ondansetron Injectable 4 milliGRAM(s) IV Push every 8 hours PRN Nausea and/or Vomiting  traMADol 25 milliGRAM(s) Oral every 6 hours PRN Severe Pain (7 - 10)      12 Jun 2022 09:34    137    |  104    |  11     ----------------------------<  123    3.4     |  24     |  0.98     Ca    8.4        12 Jun 2022 09:34  Phos  2.7       11 Jun 2022 09:01  Mg     2.4       11 Jun 2022 09:01    TPro  6.4    /  Alb  2.5    /  TBili  0.8    /  DBili  x      /  AST  26     /  ALT  42     /  AlkPhos  75     12 Jun 2022 09:34  LIVER FUNCTIONS - ( 12 Jun 2022 09:34 )  Alb: 2.5 g/dL / Pro: 6.4 gm/dL / ALK PHOS: 75 U/L / ALT: 42 U/L / AST: 26 U/L / GGT: x         PT/INR - ( 10 Avelino 2022 20:07 )   PT: 14.1 sec;   INR: 1.21 ratio         PTT - ( 10 Avelino 2022 20:07 )  PTT:31.2 secCBC Full  -  ( 12 Jun 2022 09:34 )  WBC Count : 6.66 K/uL  Hemoglobin : 12.4 g/dL  Hematocrit : 36.8 %  Platelet Count - Automated : 276 K/uL  Mean Cell Volume : 89.8 fl  Mean Cell Hemoglobin : 30.2 pg  Mean Cell Hemoglobin Concentration : 33.7 gm/dL  Auto Neutrophil # : x  Auto Lymphocyte # : x  Auto Monocyte # : x  Auto Eosinophil # : x  Auto Basophil # : x  Auto Neutrophil % : x  Auto Lymphocyte % : x  Auto Monocyte % : x  Auto Eosinophil % : x  Auto Basophil % : x            PT/INR - ( 10 Avelino 2022 20:07 )   PT: 14.1 sec;   INR: 1.21 ratio         PTT - ( 10 Avelino 2022 20:07 )  PTT:31.2 sec          Assessment and Plan:        	  71 yo German-speaking M with a PMH of HTN (on a medication that he does not know the name) who presents with RUQ pain due to suspected acute cholecystitis, also found to be COVID-19 positive.    #(R/O) Acute Cholecystitis  -HIDA to confirm cholycystitis; on my examination, negrete is negative, but patient does express some discomfort on palpation ruq  -surgery consult  appreciated  -c/w zosyn  -HIDA positive  and spiking high grade temps >> patient going this afternoon to IR for cholycystostomy drain;  -anticiipate DC tomarrow with PO abx and outpatient surgery follow up for gallbladder removal in few weeks      #COVID-19 Virus Infection  asymptomatic  -dimer elevated, but no PE/ no DVT  -recco  xarelto for DC given very high dimer x 30 days.     #Prophylactic Measure  - DVT PPX: Lovenox on hold for IR   - Diet: DASH  - Dispo: pending improvement in sxs, above plan

## 2022-06-13 LAB
ALBUMIN SERPL ELPH-MCNC: 2.4 G/DL — LOW (ref 3.3–5)
ALP SERPL-CCNC: 74 U/L — SIGNIFICANT CHANGE UP (ref 40–120)
ALT FLD-CCNC: 42 U/L — SIGNIFICANT CHANGE UP (ref 12–78)
ANION GAP SERPL CALC-SCNC: 7 MMOL/L — SIGNIFICANT CHANGE UP (ref 5–17)
AST SERPL-CCNC: 22 U/L — SIGNIFICANT CHANGE UP (ref 15–37)
BILIRUB SERPL-MCNC: 0.5 MG/DL — SIGNIFICANT CHANGE UP (ref 0.2–1.2)
BUN SERPL-MCNC: 8 MG/DL — SIGNIFICANT CHANGE UP (ref 7–23)
CALCIUM SERPL-MCNC: 8.5 MG/DL — SIGNIFICANT CHANGE UP (ref 8.5–10.1)
CHLORIDE SERPL-SCNC: 107 MMOL/L — SIGNIFICANT CHANGE UP (ref 96–108)
CO2 SERPL-SCNC: 24 MMOL/L — SIGNIFICANT CHANGE UP (ref 22–31)
CREAT SERPL-MCNC: 0.85 MG/DL — SIGNIFICANT CHANGE UP (ref 0.5–1.3)
EGFR: 93 ML/MIN/1.73M2 — SIGNIFICANT CHANGE UP
GLUCOSE SERPL-MCNC: 112 MG/DL — HIGH (ref 70–99)
HCT VFR BLD CALC: 38.6 % — LOW (ref 39–50)
HGB BLD-MCNC: 13 G/DL — SIGNIFICANT CHANGE UP (ref 13–17)
MCHC RBC-ENTMCNC: 30.4 PG — SIGNIFICANT CHANGE UP (ref 27–34)
MCHC RBC-ENTMCNC: 33.7 GM/DL — SIGNIFICANT CHANGE UP (ref 32–36)
MCV RBC AUTO: 90.2 FL — SIGNIFICANT CHANGE UP (ref 80–100)
PLATELET # BLD AUTO: 289 K/UL — SIGNIFICANT CHANGE UP (ref 150–400)
POTASSIUM SERPL-MCNC: 3.6 MMOL/L — SIGNIFICANT CHANGE UP (ref 3.5–5.3)
POTASSIUM SERPL-SCNC: 3.6 MMOL/L — SIGNIFICANT CHANGE UP (ref 3.5–5.3)
PROT SERPL-MCNC: 6.5 GM/DL — SIGNIFICANT CHANGE UP (ref 6–8.3)
RBC # BLD: 4.28 M/UL — SIGNIFICANT CHANGE UP (ref 4.2–5.8)
RBC # FLD: 13.3 % — SIGNIFICANT CHANGE UP (ref 10.3–14.5)
SODIUM SERPL-SCNC: 138 MMOL/L — SIGNIFICANT CHANGE UP (ref 135–145)
WBC # BLD: 4.82 K/UL — SIGNIFICANT CHANGE UP (ref 3.8–10.5)
WBC # FLD AUTO: 4.82 K/UL — SIGNIFICANT CHANGE UP (ref 3.8–10.5)

## 2022-06-13 PROCEDURE — 99232 SBSQ HOSP IP/OBS MODERATE 35: CPT

## 2022-06-13 PROCEDURE — 47490 INCISION OF GALLBLADDER: CPT

## 2022-06-13 RX ORDER — ONDANSETRON 8 MG/1
4 TABLET, FILM COATED ORAL EVERY 6 HOURS
Refills: 0 | Status: DISCONTINUED | OUTPATIENT
Start: 2022-06-13 | End: 2022-06-13

## 2022-06-13 RX ORDER — OXYCODONE HYDROCHLORIDE 5 MG/1
5 TABLET ORAL ONCE
Refills: 0 | Status: DISCONTINUED | OUTPATIENT
Start: 2022-06-13 | End: 2022-06-13

## 2022-06-13 RX ORDER — SODIUM CHLORIDE 9 MG/ML
1000 INJECTION INTRAMUSCULAR; INTRAVENOUS; SUBCUTANEOUS
Refills: 0 | Status: DISCONTINUED | OUTPATIENT
Start: 2022-06-13 | End: 2022-06-13

## 2022-06-13 RX ADMIN — MORPHINE SULFATE 4 MILLIGRAM(S): 50 CAPSULE, EXTENDED RELEASE ORAL at 22:56

## 2022-06-13 RX ADMIN — PIPERACILLIN AND TAZOBACTAM 25 GRAM(S): 4; .5 INJECTION, POWDER, LYOPHILIZED, FOR SOLUTION INTRAVENOUS at 06:22

## 2022-06-13 RX ADMIN — SODIUM CHLORIDE 75 MILLILITER(S): 9 INJECTION, SOLUTION INTRAVENOUS at 22:41

## 2022-06-13 RX ADMIN — Medication 650 MILLIGRAM(S): at 00:12

## 2022-06-13 RX ADMIN — SODIUM CHLORIDE 75 MILLILITER(S): 9 INJECTION, SOLUTION INTRAVENOUS at 10:01

## 2022-06-13 RX ADMIN — MORPHINE SULFATE 4 MILLIGRAM(S): 50 CAPSULE, EXTENDED RELEASE ORAL at 22:41

## 2022-06-13 RX ADMIN — PIPERACILLIN AND TAZOBACTAM 25 GRAM(S): 4; .5 INJECTION, POWDER, LYOPHILIZED, FOR SOLUTION INTRAVENOUS at 22:41

## 2022-06-13 RX ADMIN — PIPERACILLIN AND TAZOBACTAM 25 GRAM(S): 4; .5 INJECTION, POWDER, LYOPHILIZED, FOR SOLUTION INTRAVENOUS at 13:42

## 2022-06-13 NOTE — PROGRESS NOTE ADULT - SUBJECTIVE AND OBJECTIVE BOX
s/p percut cholecystostomy tube  Will leave cholecystostomy tube x 2-3 months  No surgery x 2-3 months since he just had COVID  PO Abx

## 2022-06-13 NOTE — PROGRESS NOTE ADULT - SUBJECTIVE AND OBJECTIVE BOX
71 yo male with acute cholecystitis, COVID, keeps spiking fevers, less abd pain. Normal WBC. IR brought patient down and couldn't consent patient yesterday. Patient is on vacation, lives in [facility].    Abd soft, tenderness RUQ              Vital Signs Last 24 Hrs  T(C): 36.9 (13 Jun 2022 08:28), Max: 38.8 (12 Jun 2022 23:35)  T(F): 98.4 (13 Jun 2022 08:28), Max: 101.8 (12 Jun 2022 23:35)  HR: 66 (13 Jun 2022 08:28) (66 - 79)  BP: 124/72 (13 Jun 2022 08:28) (122/78 - 136/82)  BP(mean): --  RR: 18 (13 Jun 2022 08:28) (18 - 18)  SpO2: 95% (13 Jun 2022 08:28) (95% - 99%)                          13.0   4.82  )-----------( 289      ( 13 Jun 2022 09:05 )             38.6       06-13    138  |  107  |  8   ----------------------------<  112<H>  3.6   |  24  |  0.85    Ca    8.5      13 Jun 2022 09:05    TPro  6.5  /  Alb  2.4<L>  /  TBili  0.5  /  DBili  x   /  AST  22  /  ALT  42  /  AlkPhos  74  06-13      LIVER FUNCTIONS - ( 13 Jun 2022 09:05 )  Alb: 2.4 g/dL / Pro: 6.5 gm/dL / ALK PHOS: 74 U/L / ALT: 42 U/L / AST: 22 U/L / GGT: x             MEDICATIONS  (STANDING):  dextrose 5% + sodium chloride 0.9%. 1000 milliLiter(s) (75 mL/Hr) IV Continuous <Continuous>  piperacillin/tazobactam IVPB.. 3.375 Gram(s) IV Intermittent every 8 hours    MEDICATIONS  (PRN):  acetaminophen     Tablet .. 650 milliGRAM(s) Oral every 6 hours PRN Temp greater or equal to 38C (100.4F), Mild Pain (1 - 3)  morphine  - Injectable 4 milliGRAM(s) IV Push every 4 hours PRN Moderate Pain (4 - 6)  ondansetron Injectable 4 milliGRAM(s) IV Push every 8 hours PRN Nausea and/or Vomiting  traMADol 25 milliGRAM(s) Oral every 6 hours PRN Severe Pain (7 - 10)      MEDICATIONS  (STANDING):  dextrose 5% + sodium chloride 0.9%. 1000 milliLiter(s) (75 mL/Hr) IV Continuous <Continuous>  piperacillin/tazobactam IVPB.. 3.375 Gram(s) IV Intermittent every 8 hours

## 2022-06-13 NOTE — PROGRESS NOTE ADULT - SUBJECTIVE AND OBJECTIVE BOX
[facility] complaints and Diagnosis: acute cholycystitis    Subjective: no new complaints  except for RUQ pain  Temp of 101. 8 last night      PHYSICAL EXAM:    Daily     Daily     Vital Signs Last 24 Hrs  T(C): 36.9 (13 Jun 2022 08:28), Max: 38.8 (12 Jun 2022 23:35)  T(F): 98.4 (13 Jun 2022 08:28), Max: 101.8 (12 Jun 2022 23:35)  HR: 66 (13 Jun 2022 08:28) (66 - 79)  BP: 124/72 (13 Jun 2022 08:28) (122/78 - 136/82)  BP(mean): --  RR: 18 (13 Jun 2022 08:28) (18 - 18)  SpO2: 95% (13 Jun 2022 08:28) (95% - 99%)    Constitutional: Well appearing  HEENT: Atraumatic, NEGRITA,   Respiratory: Breath Sounds normal, no rhonchi/wheeze  Cardiovascular: N S1S2;   Gastrointestinal: Abdomen soft, ruq tender, Bowel Sounds present  Extremities: No edema, peripheral pulses present  Neurological: AAO x 3, no gross focal motor deficits  Skin: Non cellulitic, no rash, ulcers  Lymph Nodes: No lymphadenopathy noted  Back: No CVA tenderness   Musculoskeletal: non tender  Breasts: Deferred  Genitourinary: deferred  Rectal: Deferred    All Labs/EKG/Radiology/Meds reviewed by me                          13.0   4.82  )-----------( 289      ( 13 Jun 2022 09:05 )             38.6       CBC Full  -  ( 13 Jun 2022 09:05 )  WBC Count : 4.82 K/uL  RBC Count : 4.28 M/uL  Hemoglobin : 13.0 g/dL  Hematocrit : 38.6 %  Platelet Count - Automated : 289 K/uL  Mean Cell Volume : 90.2 fl  Mean Cell Hemoglobin : 30.4 pg  Mean Cell Hemoglobin Concentration : 33.7 gm/dL  Auto Neutrophil # : x  Auto Lymphocyte # : x  Auto Monocyte # : x  Auto Eosinophil # : x  Auto Basophil # : x  Auto Neutrophil % : x  Auto Lymphocyte % : x  Auto Monocyte % : x  Auto Eosinophil % : x  Auto Basophil % : x      06-13    138  |  107  |  8   ----------------------------<  112<H>  3.6   |  24  |  0.85    Ca    8.5      13 Jun 2022 09:05    TPro  6.5  /  Alb  2.4<L>  /  TBili  0.5  /  DBili  x   /  AST  22  /  ALT  42  /  AlkPhos  74  06-13      LIVER FUNCTIONS - ( 13 Jun 2022 09:05 )  Alb: 2.4 g/dL / Pro: 6.5 gm/dL / ALK PHOS: 74 U/L / ALT: 42 U/L / AST: 22 U/L / GGT: x                               MEDICATIONS  (STANDING):  dextrose 5% + sodium chloride 0.9%. 1000 milliLiter(s) (75 mL/Hr) IV Continuous <Continuous>  piperacillin/tazobactam IVPB.. 3.375 Gram(s) IV Intermittent every 8 hours    MEDICATIONS  (PRN):  acetaminophen     Tablet .. 650 milliGRAM(s) Oral every 6 hours PRN Temp greater or equal to 38C (100.4F), Mild Pain (1 - 3)  morphine  - Injectable 4 milliGRAM(s) IV Push every 4 hours PRN Moderate Pain (4 - 6)  ondansetron Injectable 4 milliGRAM(s) IV Push every 8 hours PRN Nausea and/or Vomiting  traMADol 25 milliGRAM(s) Oral every 6 hours PRN Severe Pain (7 - 10)          Assessment and Plan:        	  73 yo Martiniquais-speaking M with a PMH of HTN (on a medication that he does not know the name) who presents with RUQ pain due to suspected acute cholecystitis, also found to be COVID-19 positive.    #(R/O) Acute Cholecystitis  -HIDA to confirm cholycystitis; on my examination, Roy's is negative, but patient does express some discomfort on palpation ruq  -surgery consult  appreciated  -c/w zosyn  -HIDA positive  and spiking high grade temps >> patient going this afternoon to IR for cholecystostomy drain;  anticipate DC tomorrow with PO abx and outpatient surgery follow up for gallbladder removal in 2-3 months for cholecystectomy      #COVID-19 Virus Infection  asymptomatic  -dimer elevated, but no PE/ no DVT  -recco  xarelto for DC given very high dimer x 30 days.     #Prophylactic Measure  - DVT PPX: Lovenox on hold for IR   - Diet: DASH  - Dispo: pending improvement in sxs, above plan

## 2022-06-14 ENCOUNTER — TRANSCRIPTION ENCOUNTER (OUTPATIENT)
Age: 72
End: 2022-06-14

## 2022-06-14 LAB
ALBUMIN SERPL ELPH-MCNC: 2.4 G/DL — LOW (ref 3.3–5)
ALP SERPL-CCNC: 66 U/L — SIGNIFICANT CHANGE UP (ref 40–120)
ALT FLD-CCNC: 43 U/L — SIGNIFICANT CHANGE UP (ref 12–78)
ANION GAP SERPL CALC-SCNC: 9 MMOL/L — SIGNIFICANT CHANGE UP (ref 5–17)
AST SERPL-CCNC: 28 U/L — SIGNIFICANT CHANGE UP (ref 15–37)
BILIRUB SERPL-MCNC: 0.4 MG/DL — SIGNIFICANT CHANGE UP (ref 0.2–1.2)
BUN SERPL-MCNC: 10 MG/DL — SIGNIFICANT CHANGE UP (ref 7–23)
CALCIUM SERPL-MCNC: 8.3 MG/DL — LOW (ref 8.5–10.1)
CHLORIDE SERPL-SCNC: 109 MMOL/L — HIGH (ref 96–108)
CO2 SERPL-SCNC: 23 MMOL/L — SIGNIFICANT CHANGE UP (ref 22–31)
CREAT SERPL-MCNC: 0.94 MG/DL — SIGNIFICANT CHANGE UP (ref 0.5–1.3)
EGFR: 87 ML/MIN/1.73M2 — SIGNIFICANT CHANGE UP
GLUCOSE SERPL-MCNC: 122 MG/DL — HIGH (ref 70–99)
HCT VFR BLD CALC: 40 % — SIGNIFICANT CHANGE UP (ref 39–50)
HGB BLD-MCNC: 13.6 G/DL — SIGNIFICANT CHANGE UP (ref 13–17)
MCHC RBC-ENTMCNC: 30.4 PG — SIGNIFICANT CHANGE UP (ref 27–34)
MCHC RBC-ENTMCNC: 34 GM/DL — SIGNIFICANT CHANGE UP (ref 32–36)
MCV RBC AUTO: 89.3 FL — SIGNIFICANT CHANGE UP (ref 80–100)
NRBC # BLD: SIGNIFICANT CHANGE UP /100 WBCS (ref 0–0)
PLATELET # BLD AUTO: 332 K/UL — SIGNIFICANT CHANGE UP (ref 150–400)
POTASSIUM SERPL-MCNC: 3.5 MMOL/L — SIGNIFICANT CHANGE UP (ref 3.5–5.3)
POTASSIUM SERPL-SCNC: 3.5 MMOL/L — SIGNIFICANT CHANGE UP (ref 3.5–5.3)
PROT SERPL-MCNC: 6.4 GM/DL — SIGNIFICANT CHANGE UP (ref 6–8.3)
RBC # BLD: 4.48 M/UL — SIGNIFICANT CHANGE UP (ref 4.2–5.8)
RBC # FLD: 13.2 % — SIGNIFICANT CHANGE UP (ref 10.3–14.5)
SODIUM SERPL-SCNC: 141 MMOL/L — SIGNIFICANT CHANGE UP (ref 135–145)
WBC # BLD: 4.34 K/UL — SIGNIFICANT CHANGE UP (ref 3.8–10.5)
WBC # FLD AUTO: 4.34 K/UL — SIGNIFICANT CHANGE UP (ref 3.8–10.5)

## 2022-06-14 PROCEDURE — 99232 SBSQ HOSP IP/OBS MODERATE 35: CPT

## 2022-06-14 RX ORDER — OXYCODONE HYDROCHLORIDE 5 MG/1
1 TABLET ORAL
Qty: 12 | Refills: 0
Start: 2022-06-14 | End: 2022-06-17

## 2022-06-14 RX ORDER — ACETAMINOPHEN 500 MG
2 TABLET ORAL
Qty: 0 | Refills: 0 | DISCHARGE
Start: 2022-06-14

## 2022-06-14 RX ORDER — ENOXAPARIN SODIUM 100 MG/ML
40 INJECTION SUBCUTANEOUS EVERY 24 HOURS
Refills: 0 | Status: DISCONTINUED | OUTPATIENT
Start: 2022-06-14 | End: 2022-06-15

## 2022-06-14 RX ADMIN — PIPERACILLIN AND TAZOBACTAM 25 GRAM(S): 4; .5 INJECTION, POWDER, LYOPHILIZED, FOR SOLUTION INTRAVENOUS at 21:48

## 2022-06-14 RX ADMIN — ENOXAPARIN SODIUM 40 MILLIGRAM(S): 100 INJECTION SUBCUTANEOUS at 18:00

## 2022-06-14 RX ADMIN — PIPERACILLIN AND TAZOBACTAM 25 GRAM(S): 4; .5 INJECTION, POWDER, LYOPHILIZED, FOR SOLUTION INTRAVENOUS at 05:47

## 2022-06-14 RX ADMIN — TRAMADOL HYDROCHLORIDE 25 MILLIGRAM(S): 50 TABLET ORAL at 10:00

## 2022-06-14 RX ADMIN — PIPERACILLIN AND TAZOBACTAM 25 GRAM(S): 4; .5 INJECTION, POWDER, LYOPHILIZED, FOR SOLUTION INTRAVENOUS at 13:35

## 2022-06-14 NOTE — PROGRESS NOTE ADULT - ASSESSMENT
73 yo Fijian-speaking M with a PMH of HTN (on a medication that he does not know the name) who presents with RUQ pain due to acute cholecystitis, also found to be COVID-19 positive.    #Acute Cholecystitis  - RUQ US showing Cholelithiasis with gallbladder wall thickening and positive sonographic Roy sign, suspicious for acute cholecystitis  - HIDA scan positive for acute cholecystitis   - Surgery consulted, patient s/p percutaneous cholecystostomy tube placement with IR on 6/13   - Given patient COVID-19 positive, plan for cholecystostomy drain x2-3 months then patient can undergo cholecystectomy   - C/w IV zosyn, will discharge on Augmentin BID to complete 14 days total of Abx   - Wound culture negative   - outpatient surgery follow up   - pain control with tylenol vs tramadol PRN    #COVID-19 Virus Infection  - COVID-19 PCR positive on 6/10, incidentally found on admission   - asymptomatic, VSS   - D-dimer elevated 1,945  - CTA chest negative for PE or PNA  - Start Xarelto on D/C given very high dimer x30 days     #HTN  - currently diet controlled, normotensive while inpatient     #DVT ppx   - Lovenox    #Diet   - Regular     #Code  - Full    #Dispo  - Medically stable for discharge home, pending home services for cholecystostomy tube care  73 yo Greek-speaking M with a PMH of HTN (on a medication that he does not know the name) who presents with RUQ pain due to acute cholecystitis, also found to be COVID-19 positive.    #Acute Cholecystitis  - RUQ US showing Cholelithiasis with gallbladder wall thickening and positive sonographic Roy sign, suspicious for acute cholecystitis  - HIDA scan positive for acute cholecystitis   - Surgery consulted, patient s/p percutaneous cholecystostomy tube placement with IR on 6/13   - Given patient COVID-19 positive, plan for cholecystostomy drain x2-3 months then patient can undergo cholecystectomy   - C/w IV zosyn, will discharge on Augmentin BID to complete 14 days total of Abx   - Wound culture negative   - outpatient surgery follow up   - pain control with tylenol vs tramadol PRN    #COVID-19 Virus Infection  - COVID-19 PCR positive on 6/10, incidentally found on admission   - asymptomatic, VSS   - D-dimer elevated 1,945  - CTA chest negative for PE or PNA  - Start Xarelto on D/C given very high dimer x30 days     #HTN  - currently diet controlled, normotensive while inpatient     #DVT ppx   - Lovenox    #Diet   - Regular     #Code  - Full    #Dispo  - Medically stable for discharge home, pending home services for cholecystostomy tube care     Patient seen and examined with the PA. Agree with above plan of care which was discussed with the patient, family, team and PA.

## 2022-06-14 NOTE — PROGRESS NOTE ADULT - SUBJECTIVE AND OBJECTIVE BOX
Chief Complaint: RUQ abd pain       Interval events:   - No acute events overnight, VSS, pt afebrile   - Pt s/p cholecystostomy tube placement yesterday with surgery, tolerated well   - Pt with continued mild RUQ abd pain, denies any other complaints     ROS:   Patient denies any current chest pain, SOB, cough, f/c/n/v/d, LE edema, myalgias, dysuria, HA, dizziness  All other review of systems is negative unless indicated above    Physical Exam:  Vital Signs Last 24 Hrs  T(C): 37 (14 Jun 2022 08:25), Max: 37.2 (13 Jun 2022 16:27)  T(F): 98.6 (14 Jun 2022 08:25), Max: 99 (13 Jun 2022 16:27)  HR: 66 (14 Jun 2022 08:25) (60 - 76)  BP: 119/75 (14 Jun 2022 08:25) (119/75 - 136/87)  BP(mean): --  RR: 16 (14 Jun 2022 08:25) (16 - 21)  SpO2: 93% (14 Jun 2022 08:25) (93% - 98%)    Constitutional: NAD, awake and alert  HEENT: PERRLA, EOMI, MMM  Respiratory: Breath sounds are clear bilaterally, No wheezing, rales or rhonchi  Cardiovascular: S1 and S2, RRR, no murmurs, gallops or rubs  Gastrointestinal: +BS, soft, +RUQ TTP, non-distended, +cholecystostomy tube   Extremities: No peripheral edema, +DP pulses b/l  Neurological: A&O x 3, no focal deficits  Musculoskeletal: 5/5 strength b/l upper and lower extremities  Skin: Normal, skin warm and dry    Labs:  Glucose 122 mg/dL  HCO3 23 mmol/L  Chloride 109 mmol/L  Sodium 141 mmol/L>   Potassium 3.5 mmol/L  Creatinine 0.94 mg/dL  Calcium 8.3 mg/dL  BUN 10 mg/dL  eGFR 87 mL/min/1.73m2  Anion gap 9 mmol/L    WBC 4.34  Hemoglobin 13.6  Hemoatocrit 40.0  Platelet count 332    Radiology:  NM Hepatobiliary Imaging (06.11.22)  IMPRESSION:  Abnormal morphine-augmented hepatobiliary scan compatible   with acute cholecystitis.    CT Angio Chest PE Protocol w/ IV Cont (06.10.22)  IMPRESSION:  1.  No pulmonary embolism.  2.  Acute cholecystitis.  3.  There are enlarged right upper quadrant lymph nodes, which may be   reactive to acute cholecystitis.  There are additional borderline   enlarged lymph nodes adjacent to the upper pole of the left kidney and in   the aortocaval region.  Dedicated CT scan of the abdomen and pelvis is   recommended for complete evaluation.  4.  There is an incidental 3 mm left upper lobe pulmonary nodule with a   pleural tag.    US Abdomen Upper Quadrant Right (06.10.22)  IMPRESSION:  Limited study due to overlying bowel gas.    Cholelithiasis with gallbladder wall thickening and positive sonographic   Roy sign, suspicious for acute cholecystitis.    Medications:  MEDICATIONS  (STANDING):  piperacillin/tazobactam IVPB.. 3.375 Gram(s) IV Intermittent every 8 hours    MEDICATIONS  (PRN):  acetaminophen     Tablet .. 650 milliGRAM(s) Oral every 6 hours PRN Temp greater or equal to 38C (100.4F), Mild Pain (1 - 3)  morphine  - Injectable 4 milliGRAM(s) IV Push every 4 hours PRN Moderate Pain (4 - 6)  ondansetron Injectable 4 milliGRAM(s) IV Push every 8 hours PRN Nausea and/or Vomiting  traMADol 25 milliGRAM(s) Oral every 6 hours PRN Severe Pain (7 - 10)

## 2022-06-14 NOTE — DISCHARGE NOTE PROVIDER - CARE PROVIDER_API CALL
Riccardo Vyas)  ColonRectal Surgery; Surgery  [address] [place] [address]  Phone: [phone]  Fax: [phone]  Established Patient  Follow Up Time: 1 week

## 2022-06-14 NOTE — DISCHARGE NOTE PROVIDER - NSDCCPCAREPLAN_GEN_ALL_CORE_FT
PRINCIPAL DISCHARGE DIAGNOSIS  Diagnosis: Cholecystitis  Assessment and Plan of Treatment: Le diagnosticaron colecistitis aguda, que es paris inflamación de la vesícula biliar. Se realizó un procedimiento para colocar un tubo en la vesícula biliar para permitir que se drene, lo que disminuirá la presión y mejorará el dolor. Tendrá munira drenaje colocado vinay 2 a 3 meses, luego se someterá a paris cirugía para extirpar la vesícula biliar por completo. Esta cirugía no se pudo realizar en munira momento debido a que actualmente tiene COVID-19. Continúe con los antibióticos en el hogar que se enviaron a esparza farmacia y llame al consultorio del equipo de cirugía para programar paris cecile de seguimiento.      SECONDARY DISCHARGE DIAGNOSES  Diagnosis: 2019 novel coronavirus disease (COVID-19)  Assessment and Plan of Treatment:     Diagnosis: Abdominal pain  Assessment and Plan of Treatment:      PRINCIPAL DISCHARGE DIAGNOSIS  Diagnosis: Cholecystitis  Assessment and Plan of Treatment: Le diagnosticaron colecistitis aguda, que es paris inflamación de la vesícula biliar. Se realizó un procedimiento para colocar un tubo en la vesícula biliar para permitir que se drene, lo que disminuirá la presión y mejorará el dolor. Tendrá munira drenaje colocado vinay 2 a 3 meses, luego se someterá a paris cirugía para extirpar la vesícula biliar por completo. Esta cirugía no se pudo realizar en munira momento debido a que actualmente tiene COVID-19. Continúe con los antibióticos en el hogar que se enviaron a esparza farmacia y llame al consultorio del equipo de cirugía para programar paris cecile de seguimiento ([phone]).      SECONDARY DISCHARGE DIAGNOSES  Diagnosis: 2019 novel coronavirus disease (COVID-19)  Assessment and Plan of Treatment:     Diagnosis: Abdominal pain  Assessment and Plan of Treatment:

## 2022-06-14 NOTE — DISCHARGE NOTE PROVIDER - NSDCMRMEDTOKEN_GEN_ALL_CORE_FT
acetaminophen 325 mg oral tablet: 2 tab(s) orally every 6 hours, As needed, Temp greater or equal to 38C (100.4F), Mild Pain (1 - 3)  amoxicillin-clavulanate 875 mg-125 mg oral tablet: 1 tab(s) orally 2 times a day   oxyCODONE 5 mg oral capsule: 1 cap(s) orally 3 times a day, AS NEEDED for severe pain MDD:15mg  rivaroxaban 10 mg oral tablet: 1 tab(s) orally once a day    acetaminophen 325 mg oral tablet: 2 tab(s) orally every 6 hours, As needed, Temp greater or equal to 38C (100.4F), Mild Pain (1 - 3)  amoxicillin-clavulanate 875 mg-125 mg oral tablet: 1 tab(s) orally 2 times a day   lactobacillus acidophilus oral tablet: 2 tab(s) orally once a day   oxyCODONE 5 mg oral capsule: 1 cap(s) orally 3 times a day, AS NEEDED for severe pain MDD:15mg  rivaroxaban 10 mg oral tablet: 1 tab(s) orally once a day

## 2022-06-14 NOTE — PROGRESS NOTE ADULT - REASON FOR ADMISSION
cholecystitis, also COVID+
cholecystitis, COVID+
cholecystitis, also COVID+

## 2022-06-14 NOTE — DISCHARGE NOTE PROVIDER - HOSPITAL COURSE
Hospital Discharge Summary     Primary discharge diagnosis: Acute cholecystitis s/p percutaneous cholecystostomy tube     ROS:   Patient denies any current chest pain, SOB, cough, f/c/n/v/d, LE edema, myalgias, dysuria, HA, dizziness    Physical Exam:  Vital Signs Last 24 Hrs  T(C): 37 (14 Jun 2022 08:25), Max: 37.3 (13 Jun 2022 15:50)  T(F): 98.6 (14 Jun 2022 08:25), Max: 99.2 (13 Jun 2022 15:50)  HR: 66 (14 Jun 2022 08:25) (60 - 76)  BP: 119/75 (14 Jun 2022 08:25) (119/75 - 136/87)  BP(mean): --  RR: 16 (14 Jun 2022 08:25) (16 - 21)  SpO2: 93% (14 Jun 2022 08:25) (93% - 98%)    Constitutional: NAD, awake and alert, obese   HEENT: PERRLA, EOMI, MMM  Respiratory: Breath sounds are clear bilaterally, No wheezing, rales or rhonchi  Cardiovascular: S1 and S2, RRR, no murmurs, gallops or rubs  Gastrointestinal: +BS, soft, +RUQ TTP, non-distended, no CVA tenderness  Extremities: No peripheral edema, +DP pulses b/l  Neurological: A&O x 3, no focal deficits  Musculoskeletal: 5/5 strength b/l upper and lower extremities  Skin: Normal, skin warm and dry    Labs:  Glucose 122 mg/dL  CO2 23 mmol/L  Chloride  Sodium 141 mmol/L  Potassium 3.5 mmol/L  Calcium 8.3 mg/dL  Creatinine 0.94 mg/dL  BUN 10 mg/dL  eGFR 87 mL/min/1.73m2  Anion Gap 9 mmol/L    WBC 4.34 K/uL  Hemoglobin 13.6 g/dL  Hematocrit 40.0 %  Platelets 332 K/uL    LIVER FUNCTIONS - ( 14 Jun 2022 09:35 )  Alb: 2.4 g/dL / Pro: 6.4 gm/dL / ALK PHOS: 66 U/L / ALT: 43 U/L / AST: 28 U/L / GGT: x           Assessment/Plan:  71 yo Kyrgyz-speaking M with a PMH of HTN (on a medication that he does not know the name) who presents with RUQ pain due to acute cholecystitis, also found to be COVID-19 positive.    #Acute Cholecystitis  - RUQ US showing Cholelithiasis with gallbladder wall thickening and positive sonographic Roy sign, suspicious for acute cholecystitis  - HIDA scan positive for acute cholecystitis   - Surgery consulted, patient s/p percutaneous cholecystostomy tube placement with IR on 6/13   - Given patient COVID-19 positive, plan for cholecystostomy drain x2-3 months then patient can undergo cholecystectomy   - Received IV zosyn, will discharge on Augmentin BID to complete 14 days total of Abx   - Wound culture negative   - outpatient surgery follow up   - pain control with tylenol vs low dose oxycodone PRN    #COVID-19 Virus Infection  - COVID-19 PCR positive on 6/10, incidentally found on admission   - asymptomatic, VSS   - D-dimer elevated 1,945  - CTA chest negative for PE or PNA  - Start Xarelto on D/C given very high dimer x30 days     #HTN  - currently diet controlled, normotensive while inpatient     Dispo: discharge to home in stable condition    Final diagnosis, treatment plan, and follow-up recommendations were discussed and explained to the patient. The patient was given an opportunity to ask questions concerning the diagnosis and treatment plan. The patient acknowledged understanding of the diagnosis, treatment, and follow-up recommendations. The patient was advised to seek urgent care upon discharge if worsening symptoms develop prior to scheduled follow-up. Time spent on discharge included time with the patient, and also coordinating discharge care as outlined below.    Total time spent: 50 min

## 2022-06-15 ENCOUNTER — TRANSCRIPTION ENCOUNTER (OUTPATIENT)
Age: 72
End: 2022-06-15

## 2022-06-15 VITALS
TEMPERATURE: 98 F | SYSTOLIC BLOOD PRESSURE: 144 MMHG | HEART RATE: 59 BPM | RESPIRATION RATE: 18 BRPM | DIASTOLIC BLOOD PRESSURE: 85 MMHG | OXYGEN SATURATION: 95 %

## 2022-06-15 PROCEDURE — 99239 HOSP IP/OBS DSCHRG MGMT >30: CPT

## 2022-06-15 RX ORDER — LACTOBACILLUS ACIDOPHILUS 100MM CELL
2 CAPSULE ORAL
Qty: 60 | Refills: 0
Start: 2022-06-15 | End: 2022-07-14

## 2022-06-15 RX ORDER — RIVAROXABAN 15 MG-20MG
1 KIT ORAL
Qty: 30 | Refills: 0
Start: 2022-06-15 | End: 2022-07-14

## 2022-06-15 RX ADMIN — TRAMADOL HYDROCHLORIDE 25 MILLIGRAM(S): 50 TABLET ORAL at 07:26

## 2022-06-15 RX ADMIN — PIPERACILLIN AND TAZOBACTAM 25 GRAM(S): 4; .5 INJECTION, POWDER, LYOPHILIZED, FOR SOLUTION INTRAVENOUS at 06:09

## 2022-06-15 RX ADMIN — TRAMADOL HYDROCHLORIDE 25 MILLIGRAM(S): 50 TABLET ORAL at 06:11

## 2022-06-15 NOTE — DISCHARGE NOTE NURSING/CASE MANAGEMENT/SOCIAL WORK - NSDCPEFALRISK_GEN_ALL_CORE
For information on Fall & Injury Prevention, visit: [email or web address] OR  [email or web address] OR  https://www.cdc.gov/steadi/patient.html

## 2022-06-15 NOTE — DISCHARGE NOTE NURSING/CASE MANAGEMENT/SOCIAL WORK - PATIENT PORTAL LINK FT
You can access the FollowMyHealth Patient Portal offered by [facility] by registering at the following website: [email or web address]. By joining [employer]’s FollowMyHealth portal, you will also be able to view your health information using other applications (apps) compatible with our system.

## 2022-06-15 NOTE — DISCHARGE NOTE NURSING/CASE MANAGEMENT/SOCIAL WORK - NSDCFUADDAPPT_GEN_ALL_CORE_FT
Follow-up Appointment     Dr. Bach  Date: 06/23/2022  Day: Thursday  Time: 12:20pm  Location: [facility]: [address] [place] [address]  Phone: [phone]

## 2022-06-16 ENCOUNTER — EMERGENCY (EMERGENCY)
Facility: HOSPITAL | Age: 72
LOS: 0 days | Discharge: HOME | End: 2022-06-16
Attending: EMERGENCY MEDICINE
Payer: MEDICAID

## 2022-06-16 VITALS — HEIGHT: 69 IN | WEIGHT: 179.9 LBS

## 2022-06-16 VITALS
HEART RATE: 79 BPM | DIASTOLIC BLOOD PRESSURE: 85 MMHG | OXYGEN SATURATION: 98 % | SYSTOLIC BLOOD PRESSURE: 120 MMHG | RESPIRATION RATE: 18 BRPM | TEMPERATURE: 98 F

## 2022-06-16 DIAGNOSIS — Z96.89 PRESENCE OF OTHER SPECIFIED FUNCTIONAL IMPLANTS: ICD-10-CM

## 2022-06-16 DIAGNOSIS — I10 ESSENTIAL (PRIMARY) HYPERTENSION: ICD-10-CM

## 2022-06-16 DIAGNOSIS — Z48.03 ENCOUNTER FOR CHANGE OR REMOVAL OF DRAINS: ICD-10-CM

## 2022-06-16 LAB
CULTURE RESULTS: SIGNIFICANT CHANGE UP
CULTURE RESULTS: SIGNIFICANT CHANGE UP
SPECIMEN SOURCE: SIGNIFICANT CHANGE UP
SPECIMEN SOURCE: SIGNIFICANT CHANGE UP

## 2022-06-16 PROCEDURE — 99282 EMERGENCY DEPT VISIT SF MDM: CPT

## 2022-06-16 NOTE — ED STATDOCS - CARE PROVIDER_API CALL
Riccardo Vyas)  ColonRectal Surgery; Surgery  [address] [place] [address]  Phone: [phone]  Fax: [phone]  Follow Up Time:

## 2022-06-16 NOTE — ED STATDOCS - PROGRESS NOTE DETAILS
Pt. is a 72 year old male s/p biliary drain placement presents with need to have bag changed.  Pt. DC yesterday and nurse did not come to his house today.  Lynn Hdz PA-C Paged Dr. Brooks, surgeon on case without a response.  Surgical resident and PA do not know how to drain or change bag.  Paged Paged Dr. Brooks, surgeon on case without a response.  Surgical resident and PA do not know how to drain or change bag.  Paged IR, Dr. cheney.  Lynn Hdz PA-C Paged Dr. Brooks, surgeon on case without a response.  Surgical resident and PA do not know how to drain or change bag.  Paged IR, Dr. Mak.  Lynn Hdz PA-C I spoke with Dr. Mak, bag not changed but is drained.  Lynn Hdz PA-C I spoke with Dr. Mak, bag not changed but is drained.  200 cc bile emptied from bag without incident.   Lynn Hdz PA-C

## 2022-06-16 NOTE — ED STATDOCS - CLINICAL SUMMARY MEDICAL DECISION MAKING FREE TEXT BOX
Will discuss with surgical team for changing of bag and if they have supplies, discuss with pt proper f/u. Will discuss with surgical team for changing of bag and if they have supplies, discuss with pt proper f/u.              I spoke with Dr. Mak, bag not changed but is drained.  200 cc bile emptied from bag without incident.   Lnyn Hdz PA-C Will discuss with surgical team for changing of bag and if they have supplies, discuss with pt regarding plan for proper f/u.              I spoke with Dr. Mak, bag not changed but is drained.  200 cc bile emptied from bag without incident.   Lynn Hdz PA-C

## 2022-06-16 NOTE — ED STATDOCS - NSFOLLOWUPINSTRUCTIONS_ED_ALL_ED_FT
No Guía para el hogar sobre el catéter de drenaje biliar    Biliary Drainage Catheter Home Guide      Un catéter de drenaje biliar es un tubo rangel y flexible que se inserta a través de la piel en las vías biliares del hígado. La bilis es un líquido espeso amarillo o sun que ayuda a digerir la grasa de los alimentos. El propósito de un catéter de drenaje biliar es prevenir la acumulación de bilis en el hígado.    Existen albert tipos de sistemas de catéter de drenaje biliar. Siga las pautas generales y las instrucciones para esparza sistema específico de drenaje. El médico le explicará cómo hacer lo siguiente:  •Inspeccionar el catéter de drenaje.      •Irrigar el catéter de drenaje.      •Conectar y vaciar la bolsa de recolección.      •Cambiar la venda (vendaje).        ¿Cuáles son los riesgos?    En general, el sistema de catéter de drenaje biliar es seguro. No obstante, pueden presentarse complicaciones, entre las que se pueden incluir las siguientes:  •Infección.      •Sangrado.      •El catéter se sale del lugar o se obstruye.        Materiales necesarios:    Elementos necesarios para cambiar el vendaje:  •Jabón suave y agua tibia.      •Un paño limpio.      •Separe gasas de 4 x 4 pulgadas (10 x 10 cm) para usar you esponja de gasas.      •Gasas de 4 x 4 pulgadas (10 x 10 cm) o cubierta de vendaje adhesiva.      •Cinta adhesiva de papel.      Materiales necesarios para irrigar el catéter de drenaje:  •Hisopo con alcohol.      •Jeringa precargada con 10 ml de solución de agua y sal (solución salina) normal.      Elementos necesarios para vaciar la bolsa de recolección:  •Recipiente de drenaje.      •Pañuelo de papel o servilleta descartable.      •Recipiente medidor, si es necesario.        Cómo cuidar el catéter de drenaje    Cómo inspeccionar el catéter de drenaje     1.Controle el vendaje para asegurarse de que esté seco y limpio. Cambie el vendaje si se afloja, se moja o se ensucia.      2.Controle la bolsa de recolección para asegurarse de que el líquido del drenaje fluya correctamente a la bolsa. Observe el color y la cantidad en comparación con el color y la cantidad en otros días.      3.Revise el catéter de drenaje y la bolsa para asegurarse de que el tubo no esté rajado ni torcido.      Cómo irrigar el catéter de drenaje     Los catéteres de drenajebiliar deben lavarse a diario o con la frecuencia que le haya indicado el médico. El extremo del catéter de drenaje se eric con un tapón intravenoso. Se puede conectar paris jeringa directamente con el tapón intravenoso.  1.Lávese las jose miguel con agua y jabón vinay al menos 20 segundos.      2.Si el catéter de drenaje tiene válvula externa (llave de paso) conectada, gírela en dirección a la bolsa de recolección. [place] permitirá que la solución salina fluya en la dirección de esparza cuerpo.      3.Limpie el tapón intravenoso con un hisopo con alcohol.      4.Enrosque la punta de la jeringa con 10 ml de solución salina normal en el tapón intravenoso.      5.Inyecte la solución salina vinay 5 a 10 segundos. Si siente resistencia vinay la inyección, deténgase de inmediato. No tire del émbolo. Tirar del émbolo puede aumentar el riesgo de tener infecciones.      6.Retire la jeringa del tapón. Gire la llave de paso para que el líquido de esparza cuerpo hacia la bolsa de recolección. Podrá notar que más líquido fluye hacia la bolsa después de susan completado el lavado.      Cómo conectar paris bolsa al catéter de drenaje    Si tiene problemas con el drenaje biliar interno, es posible que esparza médico le indique usar un drenaje con bolsa hasta que puedan revisarlo y solucionar el problema. Por munira motivo, siempre debe tener paris bolsa de recolección y un tubo de conexión en esparza casa. Si no los tiene, recuerde pedirlos en esaprza próxima cecile.  1.Retire la bolsa y el tubo de conexión de esparza envase.      2.Conecte el extremo del embudo del tubo al vástago en forma de cono de la bolsa.      3.Quite el tapón intravenoso del drenaje biliar. Para hacerlo, desenrosque el tapón y, en esparza lugar, coloque el extremo enroscable del tubo.      4.Guarde el tapón intravenoso en paris bolsa de almacenamiento de plástico con cierre.      Cómo vaciar la bolsa de recolección    Vacíe la bolsa de recolección cuando esté ? llena. También debe vaciarla antes de irse a dormir. La mayoría de las bolsas de recolección tienen paris válvula de drenaje en la parte inferior que permite vaciarlas con facilidad.  1.Lávese las jose miguel con agua y jabón vinay al menos 20 segundos.      2.Sostenga la bolsa de recolección sobre el inodoro, un recipiente o un recipiente de recolección. Use un recipiente medidor si esparza médico le dijo que mida el drenaje.      3.Desenrosque la válvula para abrirla y deje que la bolsa se drene.      4.Cierre la válvula de manera amin para evitar filtraciones.      5.Use un pañuelo o paris servilleta descartables para limpiar la válvula.      6.Mida la cantidad de drenaje y luego deseche el líquido en el inodoro.      7.Lave el recipiente medidor con agua y jabón.      8.Registre la cantidad de drenaje según las indicaciones.        Cómo cambiar el vendaje     El vendaje que cubre el catéter de drenaje se debe cambiar semanalmente, o con más frecuencia si fuera necesario, para que el vendaje se mantenga seco. El médico le dirá con qué frecuencia debe cambiar el vendaje.  1.Lávese las jose miguel con agua y jabón vinay al menos 20 segundos.      2.Retire suavemente el vendaje usado. Evite usar tijeras para retirar el vendaje porque esto podría dañar el catéter de drenaje.      3.Lave la piel alrededor del lugar de inserción con jabón suave y agua tibia. Luego, enjuague miriam y seque la naty dando palmaditas con un paño limpio.      4.Controle la piel que rodea el catéter de drenaje para verificar si hay enrojecimiento, hinchazón o secreción de líquido amarillento o sun con mal olor. Además, revise para juan manuel si hay erupción cutánea, calor o laceración de la piel.      5.Si le sujetaron el catéter de drenaje a la piel con puntos (suturas), inspeccione la sutura para asegurarse de que siga fijada a la piel.      6.No aplique cremas, ungüentos ni alcohol en el lugar. Deje que la piel se seque al aire por completo antes de colocar un nuevo vendaje.      7.Coloque el catéter de drenaje a través del dean de la esponja de gasas. La esponja de gasas debe deslizarse por debajo del disco que mantiene el catéter de drenaje en esparza lugar.    8.Cubra el catéter de drenaje y la esponja de gasas.   •Cubra el catéter de drenaje y la esponja con paris gasa de 4 x 4 pulgadas (10 x 10 cm). El catéter de drenaje debe quedar apoyado en la gasa y no en la piel. Pegue el vendaje a la piel con cinta adhesiva.      •Si se lo indican, use paris cobertura adhesiva por encima del vendaje en lugar de la gasa y la cinta.        9.Lávese las jose miguel con agua y jabón vinay al menos 20 segundos.        Siga estas instrucciones en esparza casa    Concurra a todas las visitas de seguimiento you se lo haya indicado el médico. [place] es importante.      Comuníquese con un médico si:    •El dolor empeora después de paris mejora inicial y no se roxanne con analgésicos.      •Tiene preguntas sobre los cuidados del catéter de drenaje o la bolsa de recolección.      •La piel alrededor del lugar de inserción del catéter se lesiona.    •Tiene cualquiera de estos signos de infección alrededor del lugar de inserción del catéter:  •Aumento del enrojecimiento, la hinchazón o el dolor.      •Líquido o roselyn.      •Calor.      •Pus o mal olor.          Solicite ayuda de inmediato si:    •Tiene fiebre o escalofríos.      •Tiene paris pérdida de bilis alrededor del catéter de drenaje.      •El catéter de drenaje se obstruye.      •El catéter de drenaje se sale.        Resumen    •La bilis es un líquido espeso amarillo o sun que ayuda a digerir la grasa de los alimentos. El propósito de un catéter de drenaje biliar es prevenir la acumulación de bilis en el hígado.      •Controle la piel que rodea el catéter de drenaje para verificar si hay enrojecimiento, hinchazón o secreción de líquido amarillento o sun con mal olor. Además, revise para juan manuel si erupción cutánea o laceración de la piel.      •Los catéteres de drenajebiliar deben lavarse a diario o con la frecuencia que le haya indicado el médico.      •Vacíe la bolsa de recolección cuando esté ? llena. También debe vaciarla antes de irse a dormir.      Esta información no tiene you fin reemplazar el consejo del médico. Asegúrese de hacerle al médico cualquier pregunta que tenga.

## 2022-06-16 NOTE — ED STATDOCS - PATIENT PORTAL LINK FT
You can access the FollowMyHealth Patient Portal offered by [facility] by registering at the following website: [email or web address]. By joining [employer]’s FollowMyHealth portal, you will also be able to view your health information using other applications (apps) compatible with our system.

## 2022-06-16 NOTE — ED ADULT NURSE NOTE - OBJECTIVE STATEMENT
pt bib daughter for gallbladder bag to be drained. "Visiting nursing service did not come today". denies cp/sob/n/v/d/f. used interpreting service [facility] #[id number].

## 2022-06-16 NOTE — ED STATDOCS - OBJECTIVE STATEMENT
73 yo male w/PMHx of HTN presents to the ED with billary drain in place. Pt was d/c from hospital yesterday and was told a nurse was going to come and change bag but did not show. Pt came to the ED for drainage. No other complaints at this time. Draining appropriately.

## 2022-06-16 NOTE — ED ADULT TRIAGE NOTE - CHIEF COMPLAINT QUOTE
Pt presents to ER with gallbladder drainage bag requesting it to be emptied. Denies any other complaints

## 2022-06-21 DIAGNOSIS — I10 ESSENTIAL (PRIMARY) HYPERTENSION: ICD-10-CM

## 2022-06-21 DIAGNOSIS — U07.1 COVID-19: ICD-10-CM

## 2022-06-21 DIAGNOSIS — R91.1 SOLITARY PULMONARY NODULE: ICD-10-CM

## 2022-06-21 DIAGNOSIS — K80.00 CALCULUS OF GALLBLADDER WITH ACUTE CHOLECYSTITIS WITHOUT OBSTRUCTION: ICD-10-CM

## 2022-07-21 ENCOUNTER — EMERGENCY (EMERGENCY)
Facility: HOSPITAL | Age: 72
LOS: 0 days | Discharge: HOME | End: 2022-07-21
Attending: STUDENT IN AN ORGANIZED HEALTH CARE EDUCATION/TRAINING PROGRAM
Payer: MEDICAID

## 2022-07-21 VITALS
TEMPERATURE: 98 F | RESPIRATION RATE: 16 BRPM | SYSTOLIC BLOOD PRESSURE: 140 MMHG | OXYGEN SATURATION: 98 % | HEART RATE: 70 BPM | DIASTOLIC BLOOD PRESSURE: 94 MMHG

## 2022-07-21 VITALS — WEIGHT: 220.02 LBS | HEIGHT: 69 IN

## 2022-07-21 DIAGNOSIS — R10.11 RIGHT UPPER QUADRANT PAIN: ICD-10-CM

## 2022-07-21 DIAGNOSIS — I10 ESSENTIAL (PRIMARY) HYPERTENSION: ICD-10-CM

## 2022-07-21 DIAGNOSIS — R10.9 UNSPECIFIED ABDOMINAL PAIN: ICD-10-CM

## 2022-07-21 DIAGNOSIS — Z87.19 PERSONAL HISTORY OF OTHER DISEASES OF THE DIGESTIVE SYSTEM: ICD-10-CM

## 2022-07-21 DIAGNOSIS — Z43.4 ENCOUNTER FOR ATTENTION TO OTHER ARTIFICIAL OPENINGS OF DIGESTIVE TRACT: ICD-10-CM

## 2022-07-21 LAB
ALBUMIN SERPL ELPH-MCNC: 3.9 G/DL — SIGNIFICANT CHANGE UP (ref 3.3–5)
ALP SERPL-CCNC: 95 U/L — SIGNIFICANT CHANGE UP (ref 40–120)
ALT FLD-CCNC: 36 U/L — SIGNIFICANT CHANGE UP (ref 12–78)
ANION GAP SERPL CALC-SCNC: 2 MMOL/L — LOW (ref 5–17)
APPEARANCE UR: CLEAR — SIGNIFICANT CHANGE UP
AST SERPL-CCNC: 21 U/L — SIGNIFICANT CHANGE UP (ref 15–37)
BASOPHILS # BLD AUTO: 0.05 K/UL — SIGNIFICANT CHANGE UP (ref 0–0.2)
BASOPHILS NFR BLD AUTO: 0.5 % — SIGNIFICANT CHANGE UP (ref 0–2)
BILIRUB SERPL-MCNC: 0.9 MG/DL — SIGNIFICANT CHANGE UP (ref 0.2–1.2)
BILIRUB UR-MCNC: NEGATIVE — SIGNIFICANT CHANGE UP
BUN SERPL-MCNC: 18 MG/DL — SIGNIFICANT CHANGE UP (ref 7–23)
CALCIUM SERPL-MCNC: 9.2 MG/DL — SIGNIFICANT CHANGE UP (ref 8.5–10.1)
CHLORIDE SERPL-SCNC: 108 MMOL/L — SIGNIFICANT CHANGE UP (ref 96–108)
CO2 SERPL-SCNC: 30 MMOL/L — SIGNIFICANT CHANGE UP (ref 22–31)
COLOR SPEC: YELLOW — SIGNIFICANT CHANGE UP
CREAT SERPL-MCNC: 0.94 MG/DL — SIGNIFICANT CHANGE UP (ref 0.5–1.3)
DIFF PNL FLD: NEGATIVE — SIGNIFICANT CHANGE UP
EGFR: 87 ML/MIN/1.73M2 — SIGNIFICANT CHANGE UP
EOSINOPHIL # BLD AUTO: 0.29 K/UL — SIGNIFICANT CHANGE UP (ref 0–0.5)
EOSINOPHIL NFR BLD AUTO: 2.9 % — SIGNIFICANT CHANGE UP (ref 0–6)
GLUCOSE SERPL-MCNC: 96 MG/DL — SIGNIFICANT CHANGE UP (ref 70–99)
GLUCOSE UR QL: NEGATIVE — SIGNIFICANT CHANGE UP
HCT VFR BLD CALC: 43.7 % — SIGNIFICANT CHANGE UP (ref 39–50)
HGB BLD-MCNC: 14.6 G/DL — SIGNIFICANT CHANGE UP (ref 13–17)
IMM GRANULOCYTES NFR BLD AUTO: 0.4 % — SIGNIFICANT CHANGE UP (ref 0–1.5)
KETONES UR-MCNC: NEGATIVE — SIGNIFICANT CHANGE UP
LACTATE SERPL-SCNC: 1.8 MMOL/L — SIGNIFICANT CHANGE UP (ref 0.7–2)
LEUKOCYTE ESTERASE UR-ACNC: NEGATIVE — SIGNIFICANT CHANGE UP
LIDOCAIN IGE QN: 128 U/L — SIGNIFICANT CHANGE UP (ref 73–393)
LYMPHOCYTES # BLD AUTO: 4.9 K/UL — HIGH (ref 1–3.3)
LYMPHOCYTES # BLD AUTO: 49.1 % — HIGH (ref 13–44)
MCHC RBC-ENTMCNC: 30.2 PG — SIGNIFICANT CHANGE UP (ref 27–34)
MCHC RBC-ENTMCNC: 33.4 GM/DL — SIGNIFICANT CHANGE UP (ref 32–36)
MCV RBC AUTO: 90.5 FL — SIGNIFICANT CHANGE UP (ref 80–100)
MONOCYTES # BLD AUTO: 0.63 K/UL — SIGNIFICANT CHANGE UP (ref 0–0.9)
MONOCYTES NFR BLD AUTO: 6.3 % — SIGNIFICANT CHANGE UP (ref 2–14)
NEUTROPHILS # BLD AUTO: 4.07 K/UL — SIGNIFICANT CHANGE UP (ref 1.8–7.4)
NEUTROPHILS NFR BLD AUTO: 40.8 % — LOW (ref 43–77)
NITRITE UR-MCNC: NEGATIVE — SIGNIFICANT CHANGE UP
PH UR: 6 — SIGNIFICANT CHANGE UP (ref 5–8)
PLATELET # BLD AUTO: 290 K/UL — SIGNIFICANT CHANGE UP (ref 150–400)
POTASSIUM SERPL-MCNC: 4 MMOL/L — SIGNIFICANT CHANGE UP (ref 3.5–5.3)
POTASSIUM SERPL-SCNC: 4 MMOL/L — SIGNIFICANT CHANGE UP (ref 3.5–5.3)
PROT SERPL-MCNC: 7.9 GM/DL — SIGNIFICANT CHANGE UP (ref 6–8.3)
PROT UR-MCNC: NEGATIVE — SIGNIFICANT CHANGE UP
RBC # BLD: 4.83 M/UL — SIGNIFICANT CHANGE UP (ref 4.2–5.8)
RBC # FLD: 13.6 % — SIGNIFICANT CHANGE UP (ref 10.3–14.5)
SODIUM SERPL-SCNC: 140 MMOL/L — SIGNIFICANT CHANGE UP (ref 135–145)
SP GR SPEC: 1.01 — SIGNIFICANT CHANGE UP (ref 1.01–1.02)
UROBILINOGEN FLD QL: NEGATIVE — SIGNIFICANT CHANGE UP
WBC # BLD: 9.98 K/UL — SIGNIFICANT CHANGE UP (ref 3.8–10.5)
WBC # FLD AUTO: 9.98 K/UL — SIGNIFICANT CHANGE UP (ref 3.8–10.5)

## 2022-07-21 PROCEDURE — 74177 CT ABD & PELVIS W/CONTRAST: CPT | Mod: 26,MA

## 2022-07-21 PROCEDURE — 87086 URINE CULTURE/COLONY COUNT: CPT

## 2022-07-21 PROCEDURE — 80053 COMPREHEN METABOLIC PANEL: CPT

## 2022-07-21 PROCEDURE — 99285 EMERGENCY DEPT VISIT HI MDM: CPT

## 2022-07-21 PROCEDURE — 83605 ASSAY OF LACTIC ACID: CPT

## 2022-07-21 PROCEDURE — 96374 THER/PROPH/DIAG INJ IV PUSH: CPT | Mod: XU

## 2022-07-21 PROCEDURE — 81003 URINALYSIS AUTO W/O SCOPE: CPT

## 2022-07-21 PROCEDURE — 74177 CT ABD & PELVIS W/CONTRAST: CPT | Mod: MA

## 2022-07-21 PROCEDURE — 85025 COMPLETE CBC W/AUTO DIFF WBC: CPT

## 2022-07-21 PROCEDURE — 83690 ASSAY OF LIPASE: CPT

## 2022-07-21 PROCEDURE — 99284 EMERGENCY DEPT VISIT MOD MDM: CPT | Mod: 25

## 2022-07-21 PROCEDURE — 96375 TX/PRO/DX INJ NEW DRUG ADDON: CPT

## 2022-07-21 PROCEDURE — 96361 HYDRATE IV INFUSION ADD-ON: CPT

## 2022-07-21 PROCEDURE — 36415 COLL VENOUS BLD VENIPUNCTURE: CPT

## 2022-07-21 RX ORDER — MORPHINE SULFATE 50 MG/1
4 CAPSULE, EXTENDED RELEASE ORAL ONCE
Refills: 0 | Status: DISCONTINUED | OUTPATIENT
Start: 2022-07-21 | End: 2022-07-21

## 2022-07-21 RX ORDER — ONDANSETRON 8 MG/1
4 TABLET, FILM COATED ORAL ONCE
Refills: 0 | Status: COMPLETED | OUTPATIENT
Start: 2022-07-21 | End: 2022-07-21

## 2022-07-21 RX ORDER — SODIUM CHLORIDE 9 MG/ML
1000 INJECTION INTRAMUSCULAR; INTRAVENOUS; SUBCUTANEOUS ONCE
Refills: 0 | Status: COMPLETED | OUTPATIENT
Start: 2022-07-21 | End: 2022-07-21

## 2022-07-21 RX ADMIN — SODIUM CHLORIDE 1000 MILLILITER(S): 9 INJECTION INTRAMUSCULAR; INTRAVENOUS; SUBCUTANEOUS at 14:21

## 2022-07-21 RX ADMIN — MORPHINE SULFATE 4 MILLIGRAM(S): 50 CAPSULE, EXTENDED RELEASE ORAL at 14:21

## 2022-07-21 RX ADMIN — ONDANSETRON 4 MILLIGRAM(S): 8 TABLET, FILM COATED ORAL at 14:20

## 2022-07-21 RX ADMIN — SODIUM CHLORIDE 1000 MILLILITER(S): 9 INJECTION INTRAMUSCULAR; INTRAVENOUS; SUBCUTANEOUS at 18:01

## 2022-07-21 NOTE — ED STATDOCS - PATIENT PORTAL LINK FT
You can access the FollowMyHealth Patient Portal offered by [facility] by registering at the following website: [email or web address]. By joining [employer]’s FollowMyHealth portal, you will also be able to view your health information using other applications (apps) compatible with our system.

## 2022-07-21 NOTE — PHARMACOTHERAPY INTERVENTION NOTE - COMMENTS
Medication reconciliation completed.  Reviewed Medication list and confirmed med allergies with patient; confirmed with Dr. Curtis [facility].  Pt confirms that he does not take medication at home.

## 2022-07-21 NOTE — ED STATDOCS - OBJECTIVE STATEMENT
73 y/o male with a PMHx of HTN presents to the ED c/o worsening abd pain x3 days. Pt hospitalized last month for acute cholecystitis s/p percutaneous cholecystostomy tube placement. Denies fevers. No other complaints at this time.  ID#: [id number].

## 2022-07-21 NOTE — ED STATDOCS - GASTROINTESTINAL, MLM
abdomen soft, non-tender, and non-distended. Bowel sounds present. Biliary tube clean dry and intact without erythema, pus or TTP.

## 2022-07-21 NOTE — ED STATDOCS - ATTENDING APP SHARED VISIT CONTRIBUTION OF CARE
I, Mac Polk, DO personally saw the patient with SHANTAL.  I have personally performed a face to face diagnostic evaluation on this patient.  I have reviewed the SHANTAL note and agree with the history, exam, and plan of care, except as noted.  I personally saw the patient and performed a substantive portion of the visit including all aspects of the medical decision making.

## 2022-07-21 NOTE — ED STATDOCS - CLINICAL SUMMARY MEDICAL DECISION MAKING FREE TEXT BOX
Adult male c/o 3 days of pain to RUQ where tube was placed in gallbladder on 6/5/22.  Pt reports decreased drainage from tube.  Neg fevers, nausea, vomiting. He has RUQ ttp and erythema around tube. CT abd/pelvis does no show intra abdominal abscess. surg consulted. pt cleared for dc with follow up.

## 2022-07-21 NOTE — ED ADULT TRIAGE NOTE - CHIEF COMPLAINT QUOTE
Pt presents to er with complaints of abd pain, states he has a billary tube into his gallbladder, now complaining of increased abd pain, states drain is obstructed.

## 2022-07-21 NOTE — ED ADULT NURSE NOTE - OBJECTIVE STATEMENT
Patient comes in c/o RUQ abdominal pain where gallbladder tube is. Patient stated he thinks it is clogged because nothing came out overnight. the tube is one month old. Denies NVD. Denies cp, sob

## 2022-07-21 NOTE — ED STATDOCS - PROGRESS NOTE DETAILS
Through Pac  [id number], pt and family member c/o 3 days of pain to RUQ where tube was placed in gallbladder on 6/5/22.  Pt reports decreased drainage from tube.  Neg fevers, nausea, vomiting.  Will f/u CT results and consult based on results.  On re-exam, abd round, (+) drain to RUQ, surrounding skin s erythema, drainage.  (+) mild tender RUQ to palp.  Qi Wise PA-C Case was discussed with Surgical PA and Dr. Brooks.  He came to evaluate pt in the ED.  He cleared pt for dc home.

## 2022-07-22 LAB
CULTURE RESULTS: NO GROWTH — SIGNIFICANT CHANGE UP
SPECIMEN SOURCE: SIGNIFICANT CHANGE UP

## 2022-07-25 PROBLEM — Z00.00 ENCOUNTER FOR PREVENTIVE HEALTH EXAMINATION: Status: ACTIVE | Noted: 2022-07-25

## 2022-07-26 ENCOUNTER — OUTPATIENT (OUTPATIENT)
Dept: OUTPATIENT SERVICES | Facility: HOSPITAL | Age: 72
LOS: 1 days | End: 2022-07-26

## 2022-07-26 ENCOUNTER — APPOINTMENT (OUTPATIENT)
Dept: SURGERY | Facility: HOSPITAL | Age: 72
End: 2022-07-26

## 2022-07-26 VITALS
SYSTOLIC BLOOD PRESSURE: 152 MMHG | TEMPERATURE: 97.9 F | WEIGHT: 177 LBS | HEART RATE: 64 BPM | BODY MASS INDEX: 27.78 KG/M2 | HEIGHT: 67 IN | DIASTOLIC BLOOD PRESSURE: 92 MMHG

## 2022-07-26 NOTE — HISTORY OF PRESENT ILLNESS
[de-identified] : Pt is a 71yo M with PMH of HTN, who presented to [facility] on June 14th, 2022 with acute cholecystitis. Due to his + COVID status, the hospital elected to do a percutaneous cholecystostomy tube and patient was discharged the next day. Pt followed up with his surgeon outpatient, however, he does not take their insurance so they present to our clinic for lap, poss open, cholecystectomy. Pt denies any N/V, or abd pain. Pt states eating is going well and he has had no issues. On discharge, his perc miranda drain was putting out yellow-green fluid, however, the rate of drainage has significantly decreased in the past week. Pt also mentions that for the past couple of months has noticed BRBPR when he has BM. He states that his stools have changed in frequency from liquid to normal, however, he has been noticing blood when he strains during his BMs. Pt has not had a colonoscopy.

## 2022-07-26 NOTE — ASSESSMENT
[FreeTextEntry1] : Pt is a 73 yo with PMH of HTN presenting s/p percutaneous cholecystostomy drain placement, requiring a laparoscopic, possibly open, cholecystectomy. On incidental finding, patient also has a prolapsed internal hemorrhoid causing BRBPR. Pt has never had a colonoscopy. \par

## 2022-07-26 NOTE — PLAN
[FreeTextEntry1] : - Internal Medicine clearance via. internal medicine clinic\par - Colonoscopy to be organized by internal medicine clinic\par - Lap, possibly open, miranda with Dr. Travis

## 2022-07-26 NOTE — REASON FOR VISIT
[Initial Eval - Existing Diagnosis] : an initial evaluation of an existing diagnosis [FreeTextEntry1] : P

## 2022-07-26 NOTE — PHYSICAL EXAM
[JVD] : no jugular venous distention  [Respiratory Effort] : normal respiratory effort [Normal Rate and Rhythm] : normal rate and rhythm [Abdominal Masses] : No abdominal masses [Abdomen Tenderness] : ~T ~M No abdominal tenderness [Internal Hemorrhoid] : internal hemorrhoid was present [Nonreducible] : a nonreducible (grade IV) [Normal] : was normal [None] : there was no rectal mass  [Stool Sample Taken] : no stool obtained on rectal exam [No Rash or Lesion] : No rash or lesion [Alert] : alert [Oriented to Person] : oriented to person [Oriented to Place] : oriented to place [Oriented to Time] : oriented to time [Calm] : calm [de-identified] : Sitting comfortably in chair, NAD [de-identified] : S [de-identified] : Soft, supple neck [de-identified] : Moving all extremities [de-identified] : N

## 2022-08-11 ENCOUNTER — APPOINTMENT (OUTPATIENT)
Dept: CARDIOLOGY | Facility: HOSPITAL | Age: 72
End: 2022-08-11

## 2022-08-11 ENCOUNTER — OUTPATIENT (OUTPATIENT)
Dept: OUTPATIENT SERVICES | Facility: HOSPITAL | Age: 72
LOS: 1 days | End: 2022-08-11
Payer: SELF-PAY

## 2022-08-11 VITALS
SYSTOLIC BLOOD PRESSURE: 154 MMHG | OXYGEN SATURATION: 97 % | HEIGHT: 67 IN | HEART RATE: 74 BPM | DIASTOLIC BLOOD PRESSURE: 96 MMHG

## 2022-08-11 DIAGNOSIS — I25.10 ATHEROSCLEROTIC HEART DISEASE OF NATIVE CORONARY ARTERY WITHOUT ANGINA PECTORIS: ICD-10-CM

## 2022-08-11 PROCEDURE — G0463: CPT

## 2022-08-11 PROCEDURE — 93005 ELECTROCARDIOGRAM TRACING: CPT

## 2022-08-12 DIAGNOSIS — I10 ESSENTIAL (PRIMARY) HYPERTENSION: ICD-10-CM

## 2022-08-15 ENCOUNTER — OUTPATIENT (OUTPATIENT)
Dept: OUTPATIENT SERVICES | Facility: HOSPITAL | Age: 72
LOS: 1 days | End: 2022-08-15

## 2022-08-15 VITALS
RESPIRATION RATE: 16 BRPM | DIASTOLIC BLOOD PRESSURE: 90 MMHG | HEIGHT: 67 IN | HEART RATE: 67 BPM | OXYGEN SATURATION: 98 % | SYSTOLIC BLOOD PRESSURE: 156 MMHG | TEMPERATURE: 98 F | WEIGHT: 186.07 LBS

## 2022-08-15 DIAGNOSIS — Z87.19 PERSONAL HISTORY OF OTHER DISEASES OF THE DIGESTIVE SYSTEM: Chronic | ICD-10-CM

## 2022-08-15 DIAGNOSIS — K81.0 ACUTE CHOLECYSTITIS: ICD-10-CM

## 2022-08-15 DIAGNOSIS — I10 ESSENTIAL (PRIMARY) HYPERTENSION: ICD-10-CM

## 2022-08-15 DIAGNOSIS — Z78.9 OTHER SPECIFIED HEALTH STATUS: ICD-10-CM

## 2022-08-15 RX ORDER — SODIUM CHLORIDE 9 MG/ML
1000 INJECTION, SOLUTION INTRAVENOUS
Refills: 0 | Status: DISCONTINUED | OUTPATIENT
Start: 2022-08-17 | End: 2022-08-17

## 2022-08-15 NOTE — H&P PST ADULT - RESPIRATORY
clear to auscultation bilaterally/no wheezes/no rales/no rhonchi/no respiratory distress/airway patent/good air movement/respirations non-labored/no intercostal retractions

## 2022-08-15 NOTE — H&P PST ADULT - HISTORY OF PRESENT ILLNESS
71 y/o Yi-speaking M with H/O: HTN, + Covid on 06/2022 presents to PST for pre op evaluation with    who presents with RUQ pain.  71 y/o Macedonian-speaking M with H/O: HTN, + Covid on 06/2022 presents to PST for pre op evaluation with pre op diagnosis: acute cholecystitis - S/P percutaneous cholecystostomy tube placement. Now schedule for laparoscopic cholecystectomy, possible open

## 2022-08-15 NOTE — H&P PST ADULT - NSANTHOSAYNRD_GEN_A_CORE
No. AYSHA screening performed.  STOP BANG Legend: 0-2 = LOW Risk; 3-4 = INTERMEDIATE Risk; 5-8 = HIGH Risk

## 2022-08-15 NOTE — H&P PST ADULT - PROBLEM SELECTOR PLAN 1
Schedule for laparoscopic cholecystectomy, possible open tentatively on 08/17/22. Pre op instructions, famotidine given and explained. Pt verbalized understanding.  Pt tested + Covid 19 on 06/2022, Covid -19 PCR not required as per protocol.

## 2022-08-15 NOTE — H&P PST ADULT - GASTROINTESTINAL
details… right cholecystostomy tube in place, patent, drainage noted in leg bag/soft/nontender/nondistended/normal active bowel sounds

## 2022-08-15 NOTE — H&P PST ADULT - NSICDXPASTMEDICALHX_GEN_ALL_CORE_FT
PAST MEDICAL HISTORY:  HTN (hypertension) on Losartan     PAST MEDICAL HISTORY:  COVID-19 positive; 06/2022    HTN (hypertension) on Losartan

## 2022-08-15 NOTE — H&P PST ADULT - NEGATIVE GASTROINTESTINAL SYMPTOMS
no vomiting/no diarrhea/no change in bowel habits no vomiting/no diarrhea/no change in bowel habits/no melena

## 2022-08-15 NOTE — H&P PST ADULT - NSICDXPASTSURGICALHX_GEN_ALL_CORE_FT
PAST SURGICAL HISTORY:  No significant past surgical history      PAST SURGICAL HISTORY:  History of acute cholecystitis S/P percutaneous cholecystostomy tube placement; 06/2022

## 2022-08-15 NOTE — H&P PST ADULT - NEGATIVE GENERAL GENITOURINARY SYMPTOMS
no hematuria/no renal colic/no flank pain L/no flank pain R/no urine discoloration/no dysuria no hematuria/no renal colic/no flank pain L/no flank pain R/no urine discoloration/no bladder infections/no dysuria

## 2022-08-16 ENCOUNTER — TRANSCRIPTION ENCOUNTER (OUTPATIENT)
Age: 72
End: 2022-08-16

## 2022-08-16 NOTE — ASU PATIENT PROFILE, ADULT - NS PRO ABUSE SCREEN AFRAID ANYONE YN
ANTICOAGULATION FOLLOW-UP CLINIC VISIT    Patient Name:  Daniela Ladd  Date:  2020  Contact Type:  fax from [facility] INR self testing service    SUBJECTIVE:  Patient Findings     Comments:   No phone call due to INR being in range.  Patient has been instructed to call with new medications or changes.          Clinical Outcomes     Negatives:   Major bleeding event, Thromboembolic event, Anticoagulation-related hospital admission, Anticoagulation-related ED visit, Anticoagulation-related fatality    Comments:   No phone call due to INR being in range.  Patient has been instructed to call with new medications or changes.             OBJECTIVE    Recent labs: (last 7 days)     20   INR 2.6*       ASSESSMENT / PLAN  INR assessment THER    Recheck INR In: 1 WEEK    INR Location Home INR      Anticoagulation Summary  As of 2020    INR goal:   2.5-3.5   TTR:   87.5 % (1 y)   INR used for dosin.6 (2020)   Warfarin maintenance plan:   10 mg (2 mg x 5) every Thu; 8 mg (2 mg x 4) all other days   Full warfarin instructions:   10 mg every Thu; 8 mg all other days   Weekly warfarin total:   58 mg   No change documented:   Renee Kevin RN   Plan last modified:   Yoly Crowe RN (2020)   Next INR check:   2020   Priority:   High   Target end date:   Indefinite    Indications    Blood clot of vein in shoulder area  left [I82.602]  Factor 5 Leiden mutation  heterozygous (H) [D68.51]  Long-term (current) use of anticoagulants [Z79.01] [Z79.01]             Anticoagulation Episode Summary     INR check location:       Preferred lab:       Send INR reminders to:   [facility]    Comments:   INR goal changed per Dr Kitchen 3/21/18 has her own machine         Anticoagulation Care Providers     Provider Role Specialty Phone number    Valeri, Ling Rodriguez PA-C Referring Physician Assistant [phone]            See the Encounter Report to view Anticoagulation Flowsheet and  Dosing Calendar (Go to Encounters tab in chart review, and find the Anticoagulation Therapy Visit)     No encounter, INR received via fax.  INR in range so no telephone call.  Patient is to call INR clinic if any changes affecting INR.       Renee Kevin RN                  no

## 2022-08-16 NOTE — ASU PATIENT PROFILE, ADULT - FALL HARM RISK - UNIVERSAL INTERVENTIONS
Bed in lowest position, wheels locked, appropriate side rails in place/Call bell, personal items and telephone in reach/Instruct patient to call for assistance before getting out of bed or chair/Non-slip footwear when patient is out of bed/Wheeler to call system/Physically safe environment - no spills, clutter or unnecessary equipment/Purposeful Proactive Rounding/Room/bathroom lighting operational, light cord in reach

## 2022-08-16 NOTE — ASU PATIENT PROFILE, ADULT - NSICDXPASTSURGICALHX_GEN_ALL_CORE_FT
PAST SURGICAL HISTORY:  History of acute cholecystitis S/P percutaneous cholecystostomy tube placement; 06/2022

## 2022-08-17 ENCOUNTER — APPOINTMENT (OUTPATIENT)
Dept: TRAUMA SURGERY | Facility: HOSPITAL | Age: 72
End: 2022-08-17

## 2022-08-17 ENCOUNTER — RESULT REVIEW (OUTPATIENT)
Age: 72
End: 2022-08-17

## 2022-08-17 ENCOUNTER — INPATIENT (INPATIENT)
Facility: HOSPITAL | Age: 72
LOS: 0 days | Discharge: HOME | End: 2022-08-18
Attending: STUDENT IN AN ORGANIZED HEALTH CARE EDUCATION/TRAINING PROGRAM | Admitting: STUDENT IN AN ORGANIZED HEALTH CARE EDUCATION/TRAINING PROGRAM

## 2022-08-17 VITALS
WEIGHT: 186.07 LBS | SYSTOLIC BLOOD PRESSURE: 149 MMHG | OXYGEN SATURATION: 100 % | DIASTOLIC BLOOD PRESSURE: 91 MMHG | HEART RATE: 77 BPM | HEIGHT: 67 IN | RESPIRATION RATE: 16 BRPM | TEMPERATURE: 98 F

## 2022-08-17 DIAGNOSIS — K81.0 ACUTE CHOLECYSTITIS: ICD-10-CM

## 2022-08-17 DIAGNOSIS — Z87.19 PERSONAL HISTORY OF OTHER DISEASES OF THE DIGESTIVE SYSTEM: Chronic | ICD-10-CM

## 2022-08-17 LAB
ALBUMIN SERPL ELPH-MCNC: 4.6 G/DL — SIGNIFICANT CHANGE UP (ref 3.3–5)
ALP SERPL-CCNC: 65 U/L — SIGNIFICANT CHANGE UP (ref 40–120)
ALT FLD-CCNC: 72 U/L — HIGH (ref 4–41)
ANION GAP SERPL CALC-SCNC: 18 MMOL/L — HIGH (ref 7–14)
AST SERPL-CCNC: 72 U/L — HIGH (ref 4–40)
BILIRUB SERPL-MCNC: 1 MG/DL — SIGNIFICANT CHANGE UP (ref 0.2–1.2)
BUN SERPL-MCNC: 14 MG/DL — SIGNIFICANT CHANGE UP (ref 7–23)
CALCIUM SERPL-MCNC: 9.3 MG/DL — SIGNIFICANT CHANGE UP (ref 8.4–10.5)
CHLORIDE SERPL-SCNC: 97 MMOL/L — LOW (ref 98–107)
CO2 SERPL-SCNC: 22 MMOL/L — SIGNIFICANT CHANGE UP (ref 22–31)
CREAT SERPL-MCNC: 0.77 MG/DL — SIGNIFICANT CHANGE UP (ref 0.5–1.3)
EGFR: 96 ML/MIN/1.73M2 — SIGNIFICANT CHANGE UP
GLUCOSE SERPL-MCNC: 161 MG/DL — HIGH (ref 70–99)
HCT VFR BLD CALC: 37.8 % — LOW (ref 39–50)
HGB BLD-MCNC: 12.5 G/DL — LOW (ref 13–17)
MCHC RBC-ENTMCNC: 29.9 PG — SIGNIFICANT CHANGE UP (ref 27–34)
MCHC RBC-ENTMCNC: 33.1 GM/DL — SIGNIFICANT CHANGE UP (ref 32–36)
MCV RBC AUTO: 90.4 FL — SIGNIFICANT CHANGE UP (ref 80–100)
NRBC # BLD: 0 /100 WBCS — SIGNIFICANT CHANGE UP (ref 0–0)
NRBC # FLD: 0 K/UL — SIGNIFICANT CHANGE UP (ref 0–0)
PLATELET # BLD AUTO: 270 K/UL — SIGNIFICANT CHANGE UP (ref 150–400)
POTASSIUM SERPL-MCNC: 4.3 MMOL/L — SIGNIFICANT CHANGE UP (ref 3.5–5.3)
POTASSIUM SERPL-SCNC: 4.3 MMOL/L — SIGNIFICANT CHANGE UP (ref 3.5–5.3)
PROT SERPL-MCNC: 7.1 G/DL — SIGNIFICANT CHANGE UP (ref 6–8.3)
RBC # BLD: 4.18 M/UL — LOW (ref 4.2–5.8)
RBC # FLD: 13.7 % — SIGNIFICANT CHANGE UP (ref 10.3–14.5)
SODIUM SERPL-SCNC: 137 MMOL/L — SIGNIFICANT CHANGE UP (ref 135–145)
WBC # BLD: 12.39 K/UL — HIGH (ref 3.8–10.5)
WBC # FLD AUTO: 12.39 K/UL — HIGH (ref 3.8–10.5)

## 2022-08-17 PROCEDURE — 47562 LAPAROSCOPIC CHOLECYSTECTOMY: CPT | Mod: GC

## 2022-08-17 PROCEDURE — 93010 ELECTROCARDIOGRAM REPORT: CPT

## 2022-08-17 PROCEDURE — 88304 TISSUE EXAM BY PATHOLOGIST: CPT | Mod: 26

## 2022-08-17 DEVICE — SURGICEL 2 X 3": Type: IMPLANTABLE DEVICE | Status: FUNCTIONAL

## 2022-08-17 DEVICE — LIGATING CLIPS WECK HEMOLOK POLYMER MEDIUM-LARGE (GREEN) 6: Type: IMPLANTABLE DEVICE | Status: FUNCTIONAL

## 2022-08-17 DEVICE — VISTASEAL FIBRIN HUMAN 10ML: Type: IMPLANTABLE DEVICE | Status: FUNCTIONAL

## 2022-08-17 DEVICE — SURGICEL POWDER 3 GRAMS: Type: IMPLANTABLE DEVICE | Status: FUNCTIONAL

## 2022-08-17 DEVICE — CLIP APPLIER COVIDIEN ENDOCLIP III 5MM: Type: IMPLANTABLE DEVICE | Status: FUNCTIONAL

## 2022-08-17 DEVICE — SURGICEL 2 X 14": Type: IMPLANTABLE DEVICE | Status: FUNCTIONAL

## 2022-08-17 DEVICE — SURGICEL 4 X 8": Type: IMPLANTABLE DEVICE | Status: FUNCTIONAL

## 2022-08-17 RX ORDER — LOSARTAN POTASSIUM 100 MG/1
25 TABLET, FILM COATED ORAL DAILY
Refills: 0 | Status: DISCONTINUED | OUTPATIENT
Start: 2022-08-17 | End: 2022-08-18

## 2022-08-17 RX ORDER — LOSARTAN POTASSIUM 100 MG/1
1 TABLET, FILM COATED ORAL
Qty: 0 | Refills: 0 | DISCHARGE

## 2022-08-17 RX ORDER — HEPARIN SODIUM 5000 [USP'U]/ML
5000 INJECTION INTRAVENOUS; SUBCUTANEOUS EVERY 8 HOURS
Refills: 0 | Status: DISCONTINUED | OUTPATIENT
Start: 2022-08-17 | End: 2022-08-18

## 2022-08-17 RX ORDER — OXYCODONE HYDROCHLORIDE 5 MG/1
2.5 TABLET ORAL EVERY 4 HOURS
Refills: 0 | Status: DISCONTINUED | OUTPATIENT
Start: 2022-08-17 | End: 2022-08-18

## 2022-08-17 RX ORDER — ACETAMINOPHEN 500 MG
975 TABLET ORAL EVERY 6 HOURS
Refills: 0 | Status: DISCONTINUED | OUTPATIENT
Start: 2022-08-17 | End: 2022-08-18

## 2022-08-17 RX ORDER — OXYCODONE HYDROCHLORIDE 5 MG/1
5 TABLET ORAL ONCE
Refills: 0 | Status: DISCONTINUED | OUTPATIENT
Start: 2022-08-17 | End: 2022-08-17

## 2022-08-17 RX ORDER — SODIUM CHLORIDE 9 MG/ML
500 INJECTION, SOLUTION INTRAVENOUS ONCE
Refills: 0 | Status: COMPLETED | OUTPATIENT
Start: 2022-08-17 | End: 2022-08-17

## 2022-08-17 RX ORDER — ACETAMINOPHEN 500 MG
650 TABLET ORAL EVERY 6 HOURS
Refills: 0 | Status: DISCONTINUED | OUTPATIENT
Start: 2022-08-17 | End: 2022-08-17

## 2022-08-17 RX ORDER — HYDROMORPHONE HYDROCHLORIDE 2 MG/ML
0.5 INJECTION INTRAMUSCULAR; INTRAVENOUS; SUBCUTANEOUS
Refills: 0 | Status: DISCONTINUED | OUTPATIENT
Start: 2022-08-17 | End: 2022-08-17

## 2022-08-17 RX ORDER — FENTANYL CITRATE 50 UG/ML
50 INJECTION INTRAVENOUS ONCE
Refills: 0 | Status: DISCONTINUED | OUTPATIENT
Start: 2022-08-17 | End: 2022-08-17

## 2022-08-17 RX ORDER — OXYCODONE HYDROCHLORIDE 5 MG/1
10 TABLET ORAL ONCE
Refills: 0 | Status: DISCONTINUED | OUTPATIENT
Start: 2022-08-17 | End: 2022-08-17

## 2022-08-17 RX ORDER — FENTANYL CITRATE 50 UG/ML
25 INJECTION INTRAVENOUS
Refills: 0 | Status: DISCONTINUED | OUTPATIENT
Start: 2022-08-17 | End: 2022-08-17

## 2022-08-17 RX ORDER — OXYCODONE HYDROCHLORIDE 5 MG/1
5 TABLET ORAL EVERY 4 HOURS
Refills: 0 | Status: DISCONTINUED | OUTPATIENT
Start: 2022-08-17 | End: 2022-08-18

## 2022-08-17 RX ORDER — ONDANSETRON 8 MG/1
4 TABLET, FILM COATED ORAL ONCE
Refills: 0 | Status: DISCONTINUED | OUTPATIENT
Start: 2022-08-17 | End: 2022-08-17

## 2022-08-17 RX ADMIN — HYDROMORPHONE HYDROCHLORIDE 0.5 MILLIGRAM(S): 2 INJECTION INTRAMUSCULAR; INTRAVENOUS; SUBCUTANEOUS at 14:07

## 2022-08-17 RX ADMIN — SODIUM CHLORIDE 1000 MILLILITER(S): 9 INJECTION, SOLUTION INTRAVENOUS at 19:35

## 2022-08-17 RX ADMIN — LOSARTAN POTASSIUM 25 MILLIGRAM(S): 100 TABLET, FILM COATED ORAL at 19:34

## 2022-08-17 RX ADMIN — Medication 975 MILLIGRAM(S): at 19:34

## 2022-08-17 RX ADMIN — HYDROMORPHONE HYDROCHLORIDE 0.5 MILLIGRAM(S): 2 INJECTION INTRAMUSCULAR; INTRAVENOUS; SUBCUTANEOUS at 14:25

## 2022-08-17 RX ADMIN — SODIUM CHLORIDE 30 MILLILITER(S): 9 INJECTION, SOLUTION INTRAVENOUS at 15:34

## 2022-08-17 RX ADMIN — SODIUM CHLORIDE 1000 MILLILITER(S): 9 INJECTION, SOLUTION INTRAVENOUS at 15:38

## 2022-08-17 RX ADMIN — HEPARIN SODIUM 5000 UNIT(S): 5000 INJECTION INTRAVENOUS; SUBCUTANEOUS at 15:33

## 2022-08-17 NOTE — BRIEF OPERATIVE NOTE - OPERATION/FINDINGS
Chronically inflamed gallbladder with percutaneous cholecystostomy tube present. Cholecystostomy tube removed. Cystic artery and duct identified, triply clipped and cut. Gallbladder removed off the liver bed with electrocautery. During removal of gallbladder from the liver bed, bleeding from the liver bed was noted. Bleeding was addressed with both hook electrocautery and LigaSure. Pressure was held with reduction in bleeding but continued bleeding was noted. Surgicel and continued pressure applied with marked reduction in bleeding. Neodesha seal and Surgicel powder applied to gallbladder fossa. Hemostasis achieved. 19Fr Brayan drain left under liver bed.

## 2022-08-17 NOTE — CHART NOTE - NSCHARTNOTEFT_GEN_A_CORE
Surgery Post-Op Note    Pre-Op Dx: cholecystitis   Procedure: Laparoscopic cholecystectomy      Surgeon: hazel    SUBJECTIVE:  Pt seen and examined at the bedside. Pt w/ no complaints. Denies F/C/N/V. Pain controlled with medication. Pt is persistently tachycardic in PACU to 1teens (147/102, 115, 98% RA, 18). Stat EKG, cbc, cmp ordered. EBL 1L. Voided 400mL. Does not endorse pain.     OBJECTIVE:  Vital Signs Last 24 Hrs  T(C): 36.5 (17 Aug 2022 16:00), Max: 36.7 (17 Aug 2022 07:45)  T(F): 97.7 (17 Aug 2022 16:00), Max: 98.1 (17 Aug 2022 07:45)  HR: 106 (17 Aug 2022 16:30) (77 - 108)  BP: 144/88 (17 Aug 2022 16:30) (122/78 - 149/91)  BP(mean): 101 (17 Aug 2022 16:30) (84 - 101)  RR: 18 (17 Aug 2022 16:30) (12 - 18)  SpO2: 98% (17 Aug 2022 16:30) (95% - 100%)    Parameters below as of 17 Aug 2022 16:00  Patient On (Oxygen Delivery Method): room air        Physical Exam:  General: NAD, resting comfortably in bed  Neuro: A/O x 3, no focal deficits  Pulmonary: Nonlabored breathing, no respiratory distress  Cardiovascular: tachycardic  Abdominal: obese, lap sites CDI, distended. RICARDO x1 RUQ bloody output, 7mLs emptied.   Extremities: WWP, +DP, +PT                CAPILLARY BLOOD GLUCOSE            ABO Interpretation: O (08-17 @ 11:27)        ASSESSMENT:72y Male now 4hours s/p lap miranda    PLAN:  - admit to B team surgery  - EKG  - OOB/ Ambulate   - CBC, CMP     Team Surgery  [id number]

## 2022-08-18 ENCOUNTER — TRANSCRIPTION ENCOUNTER (OUTPATIENT)
Age: 72
End: 2022-08-18

## 2022-08-18 VITALS
RESPIRATION RATE: 16 BRPM | TEMPERATURE: 98 F | SYSTOLIC BLOOD PRESSURE: 120 MMHG | HEART RATE: 82 BPM | DIASTOLIC BLOOD PRESSURE: 65 MMHG | OXYGEN SATURATION: 99 %

## 2022-08-18 LAB
ALBUMIN SERPL ELPH-MCNC: 4 G/DL — SIGNIFICANT CHANGE UP (ref 3.3–5)
ALP SERPL-CCNC: 53 U/L — SIGNIFICANT CHANGE UP (ref 40–120)
ALT FLD-CCNC: 57 U/L — HIGH (ref 4–41)
ANION GAP SERPL CALC-SCNC: 11 MMOL/L — SIGNIFICANT CHANGE UP (ref 7–14)
ANION GAP SERPL CALC-SCNC: 12 MMOL/L
AST SERPL-CCNC: 52 U/L — HIGH (ref 4–40)
BILIRUB DIRECT SERPL-MCNC: 0.3 MG/DL — SIGNIFICANT CHANGE UP (ref 0–0.3)
BILIRUB INDIRECT FLD-MCNC: 1 MG/DL — SIGNIFICANT CHANGE UP (ref 0–1)
BILIRUB SERPL-MCNC: 1.3 MG/DL — HIGH (ref 0.2–1.2)
BUN SERPL-MCNC: 10 MG/DL
BUN SERPL-MCNC: 19 MG/DL — SIGNIFICANT CHANGE UP (ref 7–23)
CALCIUM SERPL-MCNC: 9 MG/DL — SIGNIFICANT CHANGE UP (ref 8.4–10.5)
CALCIUM SERPL-MCNC: 9.6 MG/DL
CHLORIDE SERPL-SCNC: 102 MMOL/L
CHLORIDE SERPL-SCNC: 102 MMOL/L — SIGNIFICANT CHANGE UP (ref 98–107)
CO2 SERPL-SCNC: 24 MMOL/L — SIGNIFICANT CHANGE UP (ref 22–31)
CO2 SERPL-SCNC: 25 MMOL/L
CREAT SERPL-MCNC: 0.83 MG/DL
CREAT SERPL-MCNC: 0.88 MG/DL — SIGNIFICANT CHANGE UP (ref 0.5–1.3)
EGFR: 92 ML/MIN/1.73M2 — SIGNIFICANT CHANGE UP
EGFR: 94 ML/MIN/1.73M2
GLUCOSE SERPL-MCNC: 100 MG/DL
GLUCOSE SERPL-MCNC: 147 MG/DL — HIGH (ref 70–99)
HCT VFR BLD CALC: 34.9 % — LOW (ref 39–50)
HGB BLD-MCNC: 11.5 G/DL — LOW (ref 13–17)
MAGNESIUM SERPL-MCNC: 1.8 MG/DL — SIGNIFICANT CHANGE UP (ref 1.6–2.6)
MCHC RBC-ENTMCNC: 30.1 PG — SIGNIFICANT CHANGE UP (ref 27–34)
MCHC RBC-ENTMCNC: 33 GM/DL — SIGNIFICANT CHANGE UP (ref 32–36)
MCV RBC AUTO: 91.4 FL — SIGNIFICANT CHANGE UP (ref 80–100)
NRBC # BLD: 0 /100 WBCS — SIGNIFICANT CHANGE UP (ref 0–0)
NRBC # FLD: 0 K/UL — SIGNIFICANT CHANGE UP (ref 0–0)
PHOSPHATE SERPL-MCNC: 3.4 MG/DL — SIGNIFICANT CHANGE UP (ref 2.5–4.5)
PLATELET # BLD AUTO: 242 K/UL — SIGNIFICANT CHANGE UP (ref 150–400)
POTASSIUM SERPL-MCNC: 4.4 MMOL/L — SIGNIFICANT CHANGE UP (ref 3.5–5.3)
POTASSIUM SERPL-SCNC: 4.4 MMOL/L — SIGNIFICANT CHANGE UP (ref 3.5–5.3)
POTASSIUM SERPL-SCNC: 4.5 MMOL/L
PROT SERPL-MCNC: 6.7 G/DL — SIGNIFICANT CHANGE UP (ref 6–8.3)
RBC # BLD: 3.82 M/UL — LOW (ref 4.2–5.8)
RBC # FLD: 14.2 % — SIGNIFICANT CHANGE UP (ref 10.3–14.5)
SODIUM SERPL-SCNC: 137 MMOL/L — SIGNIFICANT CHANGE UP (ref 135–145)
SODIUM SERPL-SCNC: 140 MMOL/L
WBC # BLD: 13.91 K/UL — HIGH (ref 3.8–10.5)
WBC # FLD AUTO: 13.91 K/UL — HIGH (ref 3.8–10.5)

## 2022-08-18 RX ORDER — MAGNESIUM SULFATE 500 MG/ML
1 VIAL (ML) INJECTION ONCE
Refills: 0 | Status: DISCONTINUED | OUTPATIENT
Start: 2022-08-18 | End: 2022-08-18

## 2022-08-18 RX ORDER — OXYCODONE HYDROCHLORIDE 5 MG/1
1 TABLET ORAL
Qty: 18 | Refills: 0
Start: 2022-08-18 | End: 2022-08-20

## 2022-08-18 RX ORDER — MAGNESIUM SULFATE 500 MG/ML
2 VIAL (ML) INJECTION ONCE
Refills: 0 | Status: COMPLETED | OUTPATIENT
Start: 2022-08-18 | End: 2022-08-18

## 2022-08-18 RX ORDER — ACETAMINOPHEN 500 MG
3 TABLET ORAL
Qty: 0 | Refills: 0 | DISCHARGE
Start: 2022-08-18

## 2022-08-18 RX ADMIN — Medication 25 GRAM(S): at 10:19

## 2022-08-18 RX ADMIN — Medication 975 MILLIGRAM(S): at 05:34

## 2022-08-18 RX ADMIN — LOSARTAN POTASSIUM 25 MILLIGRAM(S): 100 TABLET, FILM COATED ORAL at 05:35

## 2022-08-18 RX ADMIN — HEPARIN SODIUM 5000 UNIT(S): 5000 INJECTION INTRAVENOUS; SUBCUTANEOUS at 12:36

## 2022-08-18 RX ADMIN — Medication 975 MILLIGRAM(S): at 06:30

## 2022-08-18 RX ADMIN — Medication 975 MILLIGRAM(S): at 12:36

## 2022-08-18 RX ADMIN — Medication 975 MILLIGRAM(S): at 00:03

## 2022-08-18 RX ADMIN — HEPARIN SODIUM 5000 UNIT(S): 5000 INJECTION INTRAVENOUS; SUBCUTANEOUS at 00:03

## 2022-08-18 RX ADMIN — Medication 975 MILLIGRAM(S): at 00:16

## 2022-08-18 RX ADMIN — HEPARIN SODIUM 5000 UNIT(S): 5000 INJECTION INTRAVENOUS; SUBCUTANEOUS at 05:38

## 2022-08-18 NOTE — DISCHARGE NOTE PROVIDER - HOSPITAL COURSE
71 y/o Japanese-speaking M with H/O: HTN, + Covid on 06/2022 presents to [facility] for pre op evaluation with pre op diagnosis: acute cholecystitis - S/P percutaneous cholecystostomy tube placement. Now schedule for laparoscopic cholecystectomy, possible open.    Patient was admitted to undergo Laparoscopic Cholecystectomy. The patient tolerated the procedure well and was transferred to the floor in stable condition. His diet was advanced, and he was placed on PO pain medication. Once he was ambulating well and voiding without difficulty, he was found to be stable for discharge to home.  Pain was well-controlled at the time of discharge and the patient was tolerating a full diet. Patient will follow-up for post-op appointment and drain removal.

## 2022-08-18 NOTE — PROGRESS NOTE ADULT - ASSESSMENT
71 yo M s/p Lap miranda w/ drain on 8/18, was initially tachycardic post op but resolved after LR bolus    PLAN:  -IVF:  -Pain Control with  -Nausea control PRN   -Diet:   -DVT Prophylaxis:   -OOB and ambulate  -Encourage IS  -Dispo:  71 yo M s/p Lap miranda w/ drain on 8/18, post op tachycardia, now HR 98 this morning.     PLAN:  - regular diet  - drain teaching in [facility] home today

## 2022-08-18 NOTE — DISCHARGE NOTE PROVIDER - CARE PROVIDER_API CALL
Zoey Travis)  Critical Care Medicine; Surgery  [address] [place] [address]  Phone: [phone]  Fax: [phone]  Follow Up Time: 2 weeks

## 2022-08-18 NOTE — PROGRESS NOTE ADULT - ATTENDING COMMENTS
I have personally interviewed and examined this patient, reviewed pertinent labs and imaging, and discussed the case with colleagues, residents, and physician assistants on B Team rounds.  More than 50% of this 35 minute encounter including face to face with the patient was spent counseling and/or coordination of care on cholecystectomy.  Time included review of vitals, labs, imaging, discussion with consultants and coordination with the operating room/emergency department.    73yo M s/p interval lap miranda after IR perc miranda, post op course complicated by tachycardia. H/h stable. Pt reports mild lower abdominal pain, well controlled with medication. Tolerating regular diet. Feels ready for discharge home. Drain serousanguinous, thin)    - D/C home with drain, to be removed in the office  - PO pain control with tylenol ATC and oxycodone PRN       The active care issues are:  1. post-op pain     The Acute Care Surgery (B Team) Attending Group Practice:  Dr. Zoey Travis    urgent issues - spectra [phone]  nonurgent issues - [phone]  patient appointments or afterhours - [phone]

## 2022-08-18 NOTE — PHYSICAL EXAM

## 2022-08-18 NOTE — PROGRESS NOTE ADULT - SUBJECTIVE AND OBJECTIVE BOX
POD1: Lap miranda w/ drain    Overnight events:  - patient was initially tachycardic post op, PACU labs and EKG unremarkable, patient was bolused with LR and tachycardia resolved. TEAM Surgery Progress Note  Patient is a 72y old  Male who presents with a chief complaint of Abdominal Pain 			 (18 Aug 2022 08:47)      INTERVAL EVENTS: Patient is POD1 s/p laparoscopic cholecystectomy, RICARDO x1. Persistently tachycardic post op up to 120s.    SUBJECTIVE: Patient seen and examined at bedside with surgical team, patient without complaints. Denies nausea, vomiting, abdominal pain.    REVIEW OF SYSTEMS:  Constitutional: No fevers or chills. No malaise or weakness.  Respiratory: No cough, wheezing, or SOB. No hemoptysis.  Cardiovascular: No chest pain or palpitations.  Gastrointestinal: No nausea, vomiting, diarrhea or constipation.   Genitourinary: No dysuria, hematuria, or frequency.    OBJECTIVE:    Vital Signs Last 24 Hrs  T(C): 36.8 (18 Aug 2022 06:00), Max: 37.1 (17 Aug 2022 18:58)  T(F): 98.2 (18 Aug 2022 06:00), Max: 98.7 (17 Aug 2022 18:58)  HR: 98 (18 Aug 2022 06:00) (82 - 118)  BP: 117/67 (18 Aug 2022 06:00) (100/63 - 144/88)  BP(mean): 91 (17 Aug 2022 18:00) (84 - 101)  RR: 17 (18 Aug 2022 06:00) (12 - 20)  SpO2: 98% (18 Aug 2022 06:00) (94% - 99%)    Parameters below as of 18 Aug 2022 06:00  Patient On (Oxygen Delivery Method): room air    I&O's Detail    17 Aug 2022 07:01  -  18 Aug 2022 07:00  --------------------------------------------------------  IN:    Lactated Ringers: 120 mL    Lactated Ringers Bolus: 500 mL    Oral Fluid: 50 mL  Total IN: 670 mL    OUT:    Bulb (mL): 69 mL    Voided (mL): 450 mL  Total OUT: 519 mL    Total NET: 151 mL      MEDICATIONS  (STANDING):  acetaminophen     Tablet .. 975 milliGRAM(s) Oral every 6 hours  heparin   Injectable 5000 Unit(s) SubCutaneous every 8 hours  losartan 25 milliGRAM(s) Oral daily  magnesium sulfate  IVPB 2 Gram(s) IV Intermittent once    MEDICATIONS  (PRN):  oxyCODONE    IR 5 milliGRAM(s) Oral every 4 hours PRN Severe Pain (7 - 10)  oxyCODONE    IR 2.5 milliGRAM(s) Oral every 4 hours PRN Moderate Pain (4 - 6)      PHYSICAL EXAM:  Constitutional: A&Ox3, NAD  Respiratory: Unlabored breathing  Abdomen: Soft, distended, lap sites CDI, JPx1 in RUQ with SS output.   Extremities: WWP, MACIAS spontaneously    LABS:                        11.5   13.91 )-----------( 242      ( 18 Aug 2022 07:01 )             34.9     08-18    137  |  102  |  19  ----------------------------<  147<H>  4.4   |  24  |  0.88    Ca    9.0      18 Aug 2022 07:01  Phos  3.4     08-18  Mg     1.80     08-18    TPro  6.7  /  Alb  4.0  /  TBili  1.3<H>  /  DBili  0.3  /  AST  52<H>  /  ALT  57<H>  /  AlkPhos  53  08-18      LIVER FUNCTIONS - ( 18 Aug 2022 07:01 )  Alb: 4.0 g/dL / Pro: 6.7 g/dL / ALK PHOS: 53 U/L / ALT: 57 U/L / AST: 52 U/L / GGT: x             ABO Interpretation: O (08-17-22 @ 11:27)  ABO Interpretation: O (08-17-22 @ 11:26)

## 2022-08-18 NOTE — ASSESSMENT
[FreeTextEntry1] : 72M w/ HTN not on meds here for pre-op for cholecystectomy. He is able to do 4 METs and does not have significant valvular findings on exam.\par \par - RCRI 0, no significant valvular lesions; no further risk modification prior to surgery\par - to see medicine for rest of care\par \par

## 2022-08-18 NOTE — END OF VISIT
[] : Fellow [FreeTextEntry3] : Fellow's note reviewed. 72 year old man referred for evaluation prior to cholecystectomy. No cardiac symptoms; only pertinent history is hypertension. Agree with assessment as outlined above.

## 2022-08-18 NOTE — DISCHARGE NOTE PROVIDER - NSDCFUADDINST_GEN_ALL_CORE_FT
WOUND CARE:  Please keep incisions clean and dry. Please do not Scrub or rub incisions. Do not use lotion or powder on incisions.  DRAINS: Please empty drains as instructed. Notify surgeon if there is a change in color or quantity of output.    BATHING: You may shower and/or sponge bathe. You may use warm soapy water in the shower and rinse, pat dry.  ACTIVITY: No heavy lifting or straining. Otherwise, you may return to your usual level of physical activity. If you are taking narcotic pain medication DO NOT drive a car, operate machinery or make important decisions.  DIET: Return to your usual diet.  NOTIFY YOUR SURGEON IF YOU HAVE: any bleeding that does not stop, any pus draining from your wound(s), any fever (over 100.4 F) persistent nausea/vomiting, or if your pain is not controlled on your discharge pain medications, unable to urinate.  Please follow up with your primary care physician in one week regarding your hospitalization, bring copies of your discharge paperwork.  Please follow up with your surgeon, Dr. Travis as an outpatient, please call to schedule appointment.

## 2022-08-18 NOTE — DISCHARGE NOTE NURSING/CASE MANAGEMENT/SOCIAL WORK - NSDCPEFALRISK_GEN_ALL_CORE
For information on Fall & Injury Prevention, visit: [email or web address] OR  [email or web address] OR  https://www.cdc.gov/steadi/patient.html

## 2022-08-18 NOTE — DISCHARGE NOTE PROVIDER - NSDCMRMEDTOKEN_GEN_ALL_CORE_FT
acetaminophen 325 mg oral tablet: 3 tab(s) orally every 6 hours, As Needed  losartan 25 mg oral tablet: 1 tab(s) orally once a day  oxyCODONE 5 mg oral tablet: 1 tab(s) orally every 4 hours, As needed, Severe Pain (7 - 10) MDD:6 tabs

## 2022-08-18 NOTE — DISCHARGE NOTE NURSING/CASE MANAGEMENT/SOCIAL WORK - PATIENT PORTAL LINK FT
You can access the FollowMyHealth Patient Portal offered by [facility] by registering at the following website: [email or web address]. By joining [facility]’s FollowMyHealth portal, you will also be able to view your health information using other applications (apps) compatible with our system.

## 2022-08-18 NOTE — REASON FOR VISIT
[FreeTextEntry1] : 72M w/ HTN not on meds here for pre-op for cholecystectomy. He reports he does not have a cardiac history, is able to go up a flight of stairs without chest pain or SOB and otherwise ROS is negative. He denies prior surgeries except for percutaneous chlecystostomy earlier this year. ECG shows NSR.

## 2022-08-18 NOTE — DISCHARGE NOTE PROVIDER - ATTENDING ATTESTATION STATEMENT
I have personally seen and examined the patient. I have collaborated with and supervised the Katarina

## 2022-08-18 NOTE — DISCHARGE NOTE PROVIDER - NSDCCPCAREPLAN_GEN_ALL_CORE_FT
PRINCIPAL DISCHARGE DIAGNOSIS  Diagnosis: Cholecystitis  Assessment and Plan of Treatment: s/p Lap Livier

## 2022-08-24 LAB — SURGICAL PATHOLOGY STUDY: SIGNIFICANT CHANGE UP

## 2022-09-06 ENCOUNTER — APPOINTMENT (OUTPATIENT)
Dept: TRAUMA SURGERY | Facility: HOSPITAL | Age: 72
End: 2022-09-06
Payer: COMMERCIAL

## 2022-09-06 PROCEDURE — 99024 POSTOP FOLLOW-UP VISIT: CPT

## 2022-09-07 PROBLEM — I10 ESSENTIAL (PRIMARY) HYPERTENSION: Chronic | Status: ACTIVE | Noted: 2022-08-15

## 2022-09-07 PROBLEM — U07.1 COVID-19: Chronic | Status: ACTIVE | Noted: 2022-08-15

## 2022-09-08 NOTE — HISTORY OF PRESENT ILLNESS
[de-identified] : 71yo M s/p laparoscopic interval cholecystectomy on 8/17 after placement of IR perc miranda at  (for acute miranda with concurrent asymptomatic COVID-19), returning for routine follow up. Pt reports he has been feeling well. Denies abdominal pain beside at the drain site, fevers/chills, n/v. Drain in place with 20ml output/d.

## 2022-09-08 NOTE — PHYSICAL EXAM
[Normal Breath Sounds] : Normal breath sounds [Normal Heart Sounds] : normal heart sounds [No Rash or Lesion] : No rash or lesion [Calm] : calm [de-identified] : NAD [de-identified] : Soft, nondistended, nontender, incisions c/d/i. drain with 20ml serousang drainage  [de-identified] : No deformities

## 2022-09-08 NOTE — ASSESSMENT
[FreeTextEntry1] : 73yo M s/p interval laparoscopic cholecystectomy after IR perc miranda placement at . Pt recovering well. Brayan drain removed at bedside without difficulty.

## 2023-02-23 NOTE — ED STATDOCS - NS ED ATTENDING STATEMENT MOD
This was a shared visit with the SHANTAL. I reviewed and verified the documentation and independently performed the documented: Split-Thickness Skin Graft Text: The defect edges were debeveled with a #15 scalpel blade.  Given the location of the defect, shape of the defect and the proximity to free margins a split thickness skin graft was deemed most appropriate.  Using a sterile surgical marker, the primary defect shape was transferred to the donor site. The split thickness graft was then harvested.  The skin graft was then placed in the primary defect and oriented appropriately.

## 2023-03-13 NOTE — ED STATDOCS - NS ED MD DISPO ADMITTING SERVICE
Recent Labs     03/11/23 2028 03/12/23  0456 03/13/23  0534   SODIUM 134* 136 139     · Sodium 133 on admission   · In setting of chronic alcohol use and decreased PO intake  · Improved and stable this AM   · Routine outpatient monitoring   · Encourage adequate PO intake and alcohol cessation MED

## 2023-12-12 ENCOUNTER — OUTPATIENT (OUTPATIENT)
Dept: OUTPATIENT SERVICES | Facility: HOSPITAL | Age: 73
LOS: 1 days | End: 2023-12-12
Payer: COMMERCIAL

## 2023-12-12 ENCOUNTER — APPOINTMENT (OUTPATIENT)
Dept: RADIOLOGY | Facility: CLINIC | Age: 73
End: 2023-12-12
Payer: COMMERCIAL

## 2023-12-12 DIAGNOSIS — I10 ESSENTIAL (PRIMARY) HYPERTENSION: ICD-10-CM

## 2023-12-12 DIAGNOSIS — R07.81 PLEURODYNIA: ICD-10-CM

## 2023-12-12 DIAGNOSIS — Z87.19 PERSONAL HISTORY OF OTHER DISEASES OF THE DIGESTIVE SYSTEM: Chronic | ICD-10-CM

## 2023-12-12 PROCEDURE — 71046 X-RAY EXAM CHEST 2 VIEWS: CPT | Mod: 26

## 2023-12-12 PROCEDURE — 71046 X-RAY EXAM CHEST 2 VIEWS: CPT

## 2024-01-06 NOTE — PROGRESS NOTE ADULT - ASSESSMENT
Transition of Care Plan:    RUR:  17%      This CM spoke with [facility] ETA- 1600 (wheel Chair Van)    [facility], spoke with weekend liaison who stated patient can be accepted to facility today 1/6/2024 [place] was the name of the admissions weekend liaison reachable at [phone].    Report to be called [phone] and patient will go to room 214,    Second IM completed over the phone with son James Chang [phone].    Family is unable to assist with transport.    CONRADO KC, CRM  1:59 PM    Addendum:  Nurse called to report that patient is on 2 liters of oxygen and H2H transport was called about this.  Nurse called and stated patient is able to safely stand for wheel chair  CONRADO KC  2:08 PM       73 yo male with acute cholecystitis/ COVID  -Will go for percutaneous cholecystostomy tube later today. Patient will need to have cholecystostomy tube x 2 months and then have gallbladder surgery.  -Surgery not recommended x 2-3 months after COVID.  -IV Abx. Switch to PO Abx once cholecystostomy placed and d/c home

## 2024-01-08 NOTE — ED ADULT NURSE NOTE - NS ED NURSE LEVEL OF CONSCIOUSNESS AFFECT
No care due was identified.  [facility] Embedded Care Due Messages. Reference number: [id number].   1/07/2024 10:36:36 PM CST   Calm/Appropriate

## 2024-04-13 NOTE — ED STATDOCS - CHIEF COMPLAINT
Airway       Patient location during procedure: OR       Procedure Start/Stop Times: 4/13/2024 6:06 AM  Staff -        Anesthesiologist:  Veda Zamora MD       Resident/Fellow: Leda Rodríguez MD       Performed By: resident  Consent for Airway        Urgency: elective  Indications and Patient Condition       Indications for airway management: caridad-procedural       Induction type:intravenous       Mask difficulty assessment: 1 - vent by mask    Final Airway Details       Final airway type: endotracheal airway       Successful airway: ETT - single  Endotracheal Airway Details        ETT size (mm): 7.0       Cuffed: yes       Cuff volume (mL): 8       Successful intubation technique: direct laryngoscopy       DL Blade Type: MAC 3       Grade View of Cords: 2       Adjucts: stylet       Position: Right       Measured from: lips       Secured at (cm): 23       Bite block used: None    Post intubation assessment        Placement verified by: capnometry, equal breath sounds and chest rise        Number of attempts at approach: 1       Number of other approaches attempted: 0       Secured with: pink tape       Ease of procedure: easy       Dentition: Intact, Unchanged and Lips/oral mucosa injury (slight bleeding on mid-upper lip)    Medication(s) Administered   Medication Administration Time: 4/13/2024 6:06 AM         The patient is a 72y year old Male complaining of abdominal pain.

## 2024-04-25 ENCOUNTER — APPOINTMENT (OUTPATIENT)
Dept: COLORECTAL SURGERY | Facility: CLINIC | Age: 74
End: 2024-04-25
Payer: MEDICAID

## 2024-04-25 VITALS
OXYGEN SATURATION: 99 % | WEIGHT: 184 LBS | HEART RATE: 66 BPM | SYSTOLIC BLOOD PRESSURE: 144 MMHG | RESPIRATION RATE: 16 BRPM | DIASTOLIC BLOOD PRESSURE: 87 MMHG | HEIGHT: 67 IN | BODY MASS INDEX: 28.88 KG/M2

## 2024-04-25 DIAGNOSIS — Z12.11 ENCOUNTER FOR SCREENING FOR MALIGNANT NEOPLASM OF COLON: ICD-10-CM

## 2024-04-25 PROCEDURE — 99203 OFFICE O/P NEW LOW 30 MIN: CPT

## 2024-04-25 NOTE — PLAN
[TextEntry] : The patient is at average risk for colorectal cancer with no significant family history.  During today's visit, we discussed all aspects of his screening colonoscopy.  We spoke about the rationale for colorectal cancer screening at his age.  We also discussed the preprocedural bowel prep and the procedure itself.  We discussed the risks and benefits of the procedure.  Risks include but are not limited to bleeding, bloating, pain, colonic perforation, and missed lesions, in addition to risks associated with the anesthesia.  I counseled the patient that the timing of the next colonoscopy would depend on the findings of this upcoming procedure.  All questions were answered to his satisfaction.  He was provided with a printout detailing the Miralax/Dulcolax prep.  I will have my  reach out to the patient to schedule colonoscopy.  He will require medical clearance prior to the procedure.  I will next see the patient for his scheduled colonoscopy.

## 2024-04-25 NOTE — PHYSICAL EXAM
[JVD] : no jugular venous distention  [Respiratory Effort] : normal respiratory effort [Normal Rate and Rhythm] : normal rate and rhythm [No Edema] : No edema [No Rash or Lesion] : No rash or lesion [Alert] : alert [Oriented to Person] : oriented to person [Oriented to Place] : oriented to place [Oriented to Time] : oriented to time [Calm] : calm [de-identified] : Soft, nondistended [de-identified] : NAD [de-identified] : Normocephalic [de-identified] : Moves all extremities

## 2024-04-25 NOTE — HISTORY OF PRESENT ILLNESS
[FreeTextEntry1] : PREET STEELE is a 74 year male who presents today in consultation for screening colonoscopy.  He has never had a colonoscopy.  He denies any symptoms of abdominal pain, bloating, nausea, emesis, change in appetite, rectal bleeding, new diarrhea or constipation, change in caliber to the stool, or unexpected weight changes.  He does have constipation but this is a longstanding issue.  No personal or family history of polyps, cancer, or inflammatory bowel disease.  He is not on any antiplatelet agents or blood thinners.  Patient is primarily Mexican-speaking, MA provided interpretation.

## 2024-06-26 ENCOUNTER — APPOINTMENT (OUTPATIENT)
Dept: COLORECTAL SURGERY | Facility: HOSPITAL | Age: 74
End: 2024-06-26

## 2024-11-14 NOTE — ED STATDOCS - CONSTITUTIONAL, MLM
normal... well appearing and in no apparent distress. Plan: -\\n- Follow up in 4-6 weeks Render In Strict Bullet Format?: No Detail Level: Zone Initiate Treatment: -\\n- ketoconazole 2 % topical cream: Apply to affected areas of genitals twice daily for 1 month. Discontinue Regimen: -\\n- Betamethasone. Initiate Treatment: -\\n- triamcinolone acetonide 0.1 % topical cream : Apply to the affected areas (arms) twice daily for 2 weeks on then 2 weeks off. Repeat as needed when flaring. Avoid face, underarms and groin.

## 2024-12-11 ENCOUNTER — APPOINTMENT (OUTPATIENT)
Dept: COLORECTAL SURGERY | Facility: CLINIC | Age: 74
End: 2024-12-11
Payer: MEDICAID

## 2024-12-11 VITALS
RESPIRATION RATE: 15 BRPM | SYSTOLIC BLOOD PRESSURE: 130 MMHG | HEART RATE: 71 BPM | TEMPERATURE: 97.3 F | DIASTOLIC BLOOD PRESSURE: 85 MMHG | HEIGHT: 67 IN | WEIGHT: 184 LBS | OXYGEN SATURATION: 98 % | BODY MASS INDEX: 28.88 KG/M2

## 2024-12-11 DIAGNOSIS — Z12.11 ENCOUNTER FOR SCREENING FOR MALIGNANT NEOPLASM OF COLON: ICD-10-CM

## 2024-12-11 PROCEDURE — 99213 OFFICE O/P EST LOW 20 MIN: CPT

## 2024-12-17 ENCOUNTER — APPOINTMENT (OUTPATIENT)
Dept: ORTHOPEDIC SURGERY | Facility: CLINIC | Age: 74
End: 2024-12-17
Payer: MEDICAID

## 2024-12-17 DIAGNOSIS — M20.019 MALLET FINGER OF UNSPECIFIED FINGER(S): ICD-10-CM

## 2024-12-17 DIAGNOSIS — M77.8 OTHER ENTHESOPATHIES, NOT ELSEWHERE CLASSIFIED: ICD-10-CM

## 2024-12-17 PROCEDURE — 73130 X-RAY EXAM OF HAND: CPT | Mod: 50

## 2024-12-17 PROCEDURE — 99204 OFFICE O/P NEW MOD 45 MIN: CPT

## 2025-01-10 ENCOUNTER — OUTPATIENT (OUTPATIENT)
Dept: OUTPATIENT SERVICES | Facility: HOSPITAL | Age: 75
LOS: 1 days | End: 2025-01-10
Payer: MEDICAID

## 2025-01-10 ENCOUNTER — RESULT REVIEW (OUTPATIENT)
Age: 75
End: 2025-01-10

## 2025-01-10 ENCOUNTER — APPOINTMENT (OUTPATIENT)
Dept: COLORECTAL SURGERY | Facility: CLINIC | Age: 75
End: 2025-01-10
Payer: MEDICAID

## 2025-01-10 ENCOUNTER — TRANSCRIPTION ENCOUNTER (OUTPATIENT)
Age: 75
End: 2025-01-10

## 2025-01-10 DIAGNOSIS — Z87.19 PERSONAL HISTORY OF OTHER DISEASES OF THE DIGESTIVE SYSTEM: Chronic | ICD-10-CM

## 2025-01-10 DIAGNOSIS — Z12.11 ENCOUNTER FOR SCREENING FOR MALIGNANT NEOPLASM OF COLON: ICD-10-CM

## 2025-01-10 PROCEDURE — 88305 TISSUE EXAM BY PATHOLOGIST: CPT

## 2025-01-10 PROCEDURE — 88305 TISSUE EXAM BY PATHOLOGIST: CPT | Mod: 26

## 2025-01-10 PROCEDURE — 45380 COLONOSCOPY AND BIOPSY: CPT | Mod: PT

## 2025-01-10 PROCEDURE — 45380 COLONOSCOPY AND BIOPSY: CPT

## 2025-01-15 LAB — SURGICAL PATHOLOGY STUDY: SIGNIFICANT CHANGE UP

## 2025-01-28 ENCOUNTER — APPOINTMENT (OUTPATIENT)
Dept: ORTHOPEDIC SURGERY | Facility: CLINIC | Age: 75
End: 2025-01-28
Payer: MEDICAID

## 2025-01-28 DIAGNOSIS — M20.039 SWAN-NECK DEFORMITY OF UNSPECIFIED FINGER(S): ICD-10-CM

## 2025-01-28 DIAGNOSIS — M20.019 MALLET FINGER OF UNSPECIFIED FINGER(S): ICD-10-CM

## 2025-01-28 PROCEDURE — 99213 OFFICE O/P EST LOW 20 MIN: CPT

## 2025-04-23 ENCOUNTER — APPOINTMENT (OUTPATIENT)
Dept: COLORECTAL SURGERY | Facility: HOSPITAL | Age: 75
End: 2025-04-23

## (undated) DEVICE — TUBING AIRSEAL TRI-LUMEN FILTERED

## (undated) DEVICE — SOL IRR POUR NS 0.9% 1000ML

## (undated) DEVICE — TROCAR COVIDIEN VERSAPORT BLADELESS OPTICAL 5MM STANDARD

## (undated) DEVICE — WARMING BLANKET FULL UNDERBODY

## (undated) DEVICE — ELCTR BOVIE TIP BLADE INSULATED 2.75" EDGE

## (undated) DEVICE — FORCEP RADIAL JAW 4 JUMBO W NDL 2.8MM 3.2MM 240CM ORANGE DISP

## (undated) DEVICE — CANISTER DISPOSABLE THIN WALL 3000CC

## (undated) DEVICE — DISSECTOR ENDOSCOPIC KITTNER SINGLE TIP

## (undated) DEVICE — SOL IRR POUR H2O 500ML

## (undated) DEVICE — SUT VICRYL 0 27" UR-6

## (undated) DEVICE — VENODYNE/SCD SLEEVE CALF MEDIUM

## (undated) DEVICE — Device

## (undated) DEVICE — D HELP - CLEARVIEW CLEARIFY SYSTEM

## (undated) DEVICE — PACK GENERAL LAPAROSCOPY

## (undated) DEVICE — TUBING INSUFFLATION LAP FILTER 10FT

## (undated) DEVICE — WARMING BLANKET UPPER ADULT

## (undated) DEVICE — KIT DEFENDO 4 OLY 4 PC

## (undated) DEVICE — ENDOCATCH 10MM SPECIMEN POUCH

## (undated) DEVICE — APPLICATOR VISTASEAL LAP DUAL 35CM RIGID

## (undated) DEVICE — DRAPE 3/4 SHEET 52X76"

## (undated) DEVICE — APPLICATOR SURGICEL LAP TROCAR POINT 2.5MM X 150MM

## (undated) DEVICE — TUBING OLYMPUS INSUFFLATION

## (undated) DEVICE — TUBING HYDRO-SURG PLUS IRRIGATOR W SMOKEVAC & PROBE

## (undated) DEVICE — TROCAR SURGIQUEST AIRSEAL 12MMX100MM

## (undated) DEVICE — PROTECTOR HEEL / ELBOW

## (undated) DEVICE — TROCAR COVIDIEN BLUNT TIP HASSAN 10MM STANDARD

## (undated) DEVICE — BLADE SURGICAL #15 CARBON

## (undated) DEVICE — DRAPE TOWEL BLUE 17" X 24"

## (undated) DEVICE — BASIN SET SINGLE

## (undated) DEVICE — ELCTR GROUNDING PAD ADULT COVIDIEN

## (undated) DEVICE — TIP METZENBAUM SCISSOR MONOPOLAR ENDOCUT (ORANGE)

## (undated) DEVICE — DRSG STERISTRIPS 0.5 X 4"

## (undated) DEVICE — SUT MONOCRYL 4-0 27" PS-2 UNDYED

## (undated) DEVICE — POSITIONER STRAP ARMBOARD VELCRO TS-30

## (undated) DEVICE — SYR LUER LOK 20CC

## (undated) DEVICE — ELCTR REM POLYHESIVE PATIENT RETURN ELECTRODE ADULT

## (undated) DEVICE — LIGASURE MARYLAND 37CM

## (undated) DEVICE — TROCAR COVIDIEN VERSAONE BLUNT TIP HASSAN 12MM